# Patient Record
Sex: FEMALE | Race: WHITE | Employment: OTHER | URBAN - NONMETROPOLITAN AREA
[De-identification: names, ages, dates, MRNs, and addresses within clinical notes are randomized per-mention and may not be internally consistent; named-entity substitution may affect disease eponyms.]

---

## 2018-09-08 ENCOUNTER — HOSPITAL ENCOUNTER (OUTPATIENT)
Dept: LAB | Age: 83
Discharge: HOME | End: 2018-09-08

## 2018-09-08 LAB
INR BLD: 2.75 INDEX
PROTHROMBIN TIME: 31 SECONDS (ref 10–14.3)

## 2019-01-01 ENCOUNTER — OUTSIDE SERVICES (OUTPATIENT)
Dept: INTERNAL MEDICINE CLINIC | Age: 84
End: 2019-01-01

## 2019-01-01 ENCOUNTER — HOSPITAL ENCOUNTER (OUTPATIENT)
Age: 84
Setting detail: SPECIMEN
Discharge: HOME | End: 2019-09-25
Payer: MEDICARE

## 2019-01-01 ENCOUNTER — HOSPITAL ENCOUNTER (OUTPATIENT)
Age: 84
Setting detail: SPECIMEN
Discharge: HOME | End: 2019-07-24
Payer: MEDICARE

## 2019-01-01 ENCOUNTER — OUTSIDE SERVICES (OUTPATIENT)
Dept: INTERNAL MEDICINE CLINIC | Age: 84
End: 2019-01-01
Payer: MEDICARE

## 2019-01-01 ENCOUNTER — HOSPITAL ENCOUNTER (OUTPATIENT)
Age: 84
Discharge: HOME | End: 2019-11-13
Payer: MEDICARE

## 2019-01-01 ENCOUNTER — HOSPITAL ENCOUNTER (OUTPATIENT)
Age: 84
Setting detail: SPECIMEN
Discharge: HOME | End: 2019-12-18
Payer: MEDICARE

## 2019-01-01 ENCOUNTER — HOSPITAL ENCOUNTER (OUTPATIENT)
Age: 84
Discharge: HOME | End: 2019-09-18
Payer: MEDICARE

## 2019-01-01 ENCOUNTER — HOSPITAL ENCOUNTER (OUTPATIENT)
Age: 84
Discharge: HOME | End: 2019-08-07
Payer: MEDICARE

## 2019-01-01 ENCOUNTER — HOSPITAL ENCOUNTER (OUTPATIENT)
Age: 84
Discharge: HOME | End: 2019-10-16
Payer: MEDICARE

## 2019-01-01 ENCOUNTER — HOSPITAL ENCOUNTER (OUTPATIENT)
Age: 84
Discharge: HOME | End: 2019-07-31
Payer: MEDICARE

## 2019-01-01 ENCOUNTER — HOSPITAL ENCOUNTER (OUTPATIENT)
Age: 84
Discharge: HOME | End: 2019-08-14
Payer: MEDICARE

## 2019-01-01 ENCOUNTER — HOSPITAL ENCOUNTER (OUTPATIENT)
Age: 84
Setting detail: SPECIMEN
Discharge: HOME | End: 2019-12-11
Payer: MEDICARE

## 2019-01-01 ENCOUNTER — HOSPITAL ENCOUNTER (OUTPATIENT)
Age: 84
Discharge: HOME | End: 2019-10-23
Payer: MEDICARE

## 2019-01-01 ENCOUNTER — HOSPITAL ENCOUNTER (OUTPATIENT)
Age: 84
Setting detail: SPECIMEN
Discharge: HOME | End: 2019-09-12
Payer: MEDICARE

## 2019-01-01 ENCOUNTER — HOSPITAL ENCOUNTER (OUTPATIENT)
Age: 84
Discharge: HOME | End: 2019-09-04
Payer: MEDICARE

## 2019-01-01 ENCOUNTER — HOSPITAL ENCOUNTER (OUTPATIENT)
Age: 84
Discharge: HOME | End: 2019-08-28
Payer: MEDICARE

## 2019-01-01 ENCOUNTER — HOSPITAL ENCOUNTER (OUTPATIENT)
Age: 84
Discharge: HOME | End: 2019-10-02
Payer: MEDICARE

## 2019-01-01 ENCOUNTER — HOSPITAL ENCOUNTER (OUTPATIENT)
Age: 84
Discharge: HOME | End: 2019-10-09
Payer: MEDICARE

## 2019-01-01 ENCOUNTER — HOSPITAL ENCOUNTER (OUTPATIENT)
Age: 84
Discharge: HOME | End: 2019-08-21
Payer: MEDICARE

## 2019-01-01 ENCOUNTER — HOSPITAL ENCOUNTER (OUTPATIENT)
Age: 84
Setting detail: SPECIMEN
Discharge: HOME | End: 2019-11-06
Payer: MEDICARE

## 2019-01-01 ENCOUNTER — HOSPITAL ENCOUNTER (OUTPATIENT)
Age: 84
Setting detail: SPECIMEN
Discharge: HOME | End: 2019-12-31
Payer: MEDICARE

## 2019-01-01 ENCOUNTER — HOSPITAL ENCOUNTER (OUTPATIENT)
Age: 84
Setting detail: SPECIMEN
Discharge: HOME | End: 2019-10-30
Payer: MEDICARE

## 2019-01-01 ENCOUNTER — HOSPITAL ENCOUNTER (OUTPATIENT)
Age: 84
Discharge: HOME | End: 2019-09-09
Payer: MEDICARE

## 2019-01-01 ENCOUNTER — HOSPITAL ENCOUNTER (OUTPATIENT)
Age: 84
Discharge: HOME | End: 2019-09-10
Payer: MEDICARE

## 2019-01-01 ENCOUNTER — HOSPITAL ENCOUNTER (OUTPATIENT)
Age: 84
Setting detail: SPECIMEN
Discharge: HOME | End: 2019-11-20
Payer: MEDICARE

## 2019-01-01 ENCOUNTER — HOSPITAL ENCOUNTER (OUTPATIENT)
Age: 84
Setting detail: SPECIMEN
Discharge: HOME | End: 2019-12-05
Payer: MEDICARE

## 2019-01-01 ENCOUNTER — HOSPITAL ENCOUNTER (OUTPATIENT)
Age: 84
Discharge: HOME | End: 2019-12-24
Payer: MEDICARE

## 2019-01-01 DIAGNOSIS — I10 ESSENTIAL HYPERTENSION: ICD-10-CM

## 2019-01-01 DIAGNOSIS — I48.91 ATRIAL FIBRILLATION, UNSPECIFIED TYPE (HCC): Primary | ICD-10-CM

## 2019-01-01 DIAGNOSIS — E78.2 MIXED HYPERLIPIDEMIA: ICD-10-CM

## 2019-01-01 DIAGNOSIS — M19.90 ARTHRITIS: ICD-10-CM

## 2019-01-01 LAB
ALBUMIN SERPL-MCNC: 3.5 GM/DL (ref 3.4–5)
ALP BLD-CCNC: 102 IU/L (ref 40–129)
ALT SERPL-CCNC: 8 U/L (ref 10–40)
ANION GAP SERPL CALCULATED.3IONS-SCNC: 8 MMOL/L (ref 4–16)
AST SERPL-CCNC: 14 IU/L (ref 15–37)
BACTERIA: ABNORMAL /HPF
BACTERIA: ABNORMAL /HPF
BILIRUB SERPL-MCNC: 0.5 MG/DL (ref 0–1)
BILIRUBIN URINE: NEGATIVE MG/DL
BILIRUBIN URINE: NEGATIVE MG/DL
BLOOD, URINE: ABNORMAL
BLOOD, URINE: ABNORMAL
BUN BLDV-MCNC: 10 MG/DL (ref 6–23)
CALCIUM SERPL-MCNC: 9.2 MG/DL (ref 8.3–10.6)
CAST TYPE: ABNORMAL /HPF
CAST TYPE: NEGATIVE /HPF
CHLORIDE BLD-SCNC: 99 MMOL/L (ref 99–110)
CLARITY: ABNORMAL
CLARITY: ABNORMAL
CO2: 31 MMOL/L (ref 21–32)
COLOR: YELLOW
COLOR: YELLOW
CREAT SERPL-MCNC: 0.8 MG/DL (ref 0.6–1.1)
CRYSTAL TYPE: ABNORMAL /HPF
CRYSTAL TYPE: NEGATIVE /HPF
CULTURE: ABNORMAL
EPITHELIAL CELLS, UA: ABNORMAL /HPF
EPITHELIAL CELLS, UA: ABNORMAL /HPF
GFR AFRICAN AMERICAN: >60 ML/MIN/1.73M2
GFR NON-AFRICAN AMERICAN: >60 ML/MIN/1.73M2
GLUCOSE BLD-MCNC: 98 MG/DL (ref 70–99)
GLUCOSE, URINE: NEGATIVE MG/DL
GLUCOSE, URINE: NEGATIVE MG/DL
HCT VFR BLD CALC: 37.2 % (ref 37–47)
HCT VFR BLD CALC: 38.1 % (ref 37–47)
HEMOGLOBIN: 11.9 GM/DL (ref 12.5–16)
HEMOGLOBIN: 12.2 GM/DL (ref 12.5–16)
INR BLD: 1.44 INDEX
INR BLD: 1.57 INDEX
INR BLD: 1.58 INDEX
INR BLD: 1.66 INDEX
INR BLD: 1.79 INDEX
INR BLD: 1.85 INDEX
INR BLD: 2.01 INDEX
INR BLD: 2.12 INDEX
INR BLD: 2.16 INDEX
INR BLD: 2.16 INDEX
INR BLD: 2.25 INDEX
INR BLD: 2.28 INDEX
INR BLD: 2.39 INDEX
INR BLD: 2.47 INDEX
INR BLD: 2.56 INDEX
INR BLD: 2.63 INDEX
INR BLD: 2.72 INDEX
INR BLD: 2.87 INDEX
INR BLD: 2.88 INDEX
INR BLD: 2.9 INDEX
INR BLD: 3.02 INDEX
INR BLD: 3.08 INDEX
INR BLD: 3.08 INDEX
INR BLD: 3.68 INDEX
KETONES, URINE: NEGATIVE MG/DL
KETONES, URINE: NEGATIVE MG/DL
LEUKOCYTE ESTERASE, URINE: ABNORMAL
LEUKOCYTE ESTERASE, URINE: ABNORMAL
Lab: ABNORMAL
Lab: ABNORMAL
MCH RBC QN AUTO: 27.2 PG (ref 27–31)
MCH RBC QN AUTO: 27.5 PG (ref 27–31)
MCHC RBC AUTO-ENTMCNC: 32 % (ref 32–36)
MCHC RBC AUTO-ENTMCNC: 32 % (ref 32–36)
MCV RBC AUTO: 85 FL (ref 78–100)
MCV RBC AUTO: 85.9 FL (ref 78–100)
MUCUS: NEGATIVE HPF
NITRITE URINE, QUANTITATIVE: NEGATIVE
NITRITE URINE, QUANTITATIVE: NEGATIVE
PDW BLD-RTO: 14.8 % (ref 11.7–14.9)
PDW BLD-RTO: 15 % (ref 11.7–14.9)
PH, URINE: 5.5 (ref 5–8)
PH, URINE: 5.5 (ref 5–8)
PLATELET # BLD: 253 K/CU MM (ref 140–440)
PLATELET # BLD: 264 K/CU MM (ref 140–440)
PMV BLD AUTO: 9 FL (ref 7.5–11.1)
PMV BLD AUTO: 9.3 FL (ref 7.5–11.1)
POTASSIUM SERPL-SCNC: 3.2 MMOL/L (ref 3.5–5.1)
PROTEIN UA: 30 MG/DL
PROTEIN UA: NEGATIVE MG/DL
PROTHROMBIN TIME: 16.4 SECONDS (ref 9.12–12.5)
PROTHROMBIN TIME: 17.8 SECONDS (ref 9.12–12.5)
PROTHROMBIN TIME: 18.2 SECONDS (ref 11.7–14.5)
PROTHROMBIN TIME: 18.6 SECONDS (ref 8.9–12.9)
PROTHROMBIN TIME: 19.1 SECONDS (ref 9.12–12.5)
PROTHROMBIN TIME: 20.6 SECONDS (ref 11.7–14.5)
PROTHROMBIN TIME: 21.3 SECONDS (ref 11.7–14.5)
PROTHROMBIN TIME: 24.4 SECONDS (ref 9.12–12.5)
PROTHROMBIN TIME: 24.6 SECONDS (ref 9.12–12.5)
PROTHROMBIN TIME: 25 SECONDS (ref 11.7–14.5)
PROTHROMBIN TIME: 25.8 SECONDS (ref 9.12–12.5)
PROTHROMBIN TIME: 26.1 SECONDS (ref 11.7–14.5)
PROTHROMBIN TIME: 27.7 SECONDS (ref 9.12–12.5)
PROTHROMBIN TIME: 27.9 SECONDS (ref 9.12–12.5)
PROTHROMBIN TIME: 29.7 SECONDS (ref 9.12–12.5)
PROTHROMBIN TIME: 29.8 SECONDS (ref 9.12–12.5)
PROTHROMBIN TIME: 31.7 SECONDS (ref 11.7–14.5)
PROTHROMBIN TIME: 33.4 SECONDS (ref 11.7–14.5)
PROTHROMBIN TIME: 33.6 SECONDS (ref 11.7–14.5)
PROTHROMBIN TIME: 33.8 SECONDS (ref 11.7–14.5)
PROTHROMBIN TIME: 35.2 SECONDS (ref 11.7–14.5)
PROTHROMBIN TIME: 35.9 SECONDS (ref 11.7–14.5)
PROTHROMBIN TIME: 35.9 SECONDS (ref 9.12–12.5)
PROTHROMBIN TIME: 43.1 SECONDS (ref 9.12–12.5)
RBC # BLD: 4.33 M/CU MM (ref 4.2–5.4)
RBC # BLD: 4.48 M/CU MM (ref 4.2–5.4)
RBC URINE: ABNORMAL /HPF (ref 0–6)
RBC URINE: ABNORMAL /HPF (ref 0–6)
SODIUM BLD-SCNC: 138 MMOL/L (ref 135–145)
SPECIFIC GRAVITY UA: 1.03 (ref 1–1.03)
SPECIFIC GRAVITY UA: <1.005 (ref 1–1.03)
SPECIMEN: ABNORMAL
SPECIMEN: ABNORMAL
TOTAL COLONY COUNT: ABNORMAL
TOTAL COLONY COUNT: ABNORMAL
TOTAL PROTEIN: 6.5 GM/DL (ref 6.4–8.2)
UROBILINOGEN, URINE: 0.2 MG/DL (ref 0.2–1)
UROBILINOGEN, URINE: 0.2 MG/DL (ref 0.2–1)
VOLUME, (UVOL): 12 ML (ref 10–12)
WBC # BLD: 5.6 K/CU MM (ref 4–10.5)
WBC # BLD: 6.4 K/CU MM (ref 4–10.5)
WBC UA: ABNORMAL /HPF (ref 0–5)
WBC UA: ABNORMAL /HPF (ref 0–5)

## 2019-01-01 PROCEDURE — 36415 COLL VENOUS BLD VENIPUNCTURE: CPT

## 2019-01-01 PROCEDURE — 85027 COMPLETE CBC AUTOMATED: CPT

## 2019-01-01 PROCEDURE — 85610 PROTHROMBIN TIME: CPT

## 2019-01-01 PROCEDURE — 99308 SBSQ NF CARE LOW MDM 20: CPT | Performed by: INTERNAL MEDICINE

## 2019-01-01 PROCEDURE — 81001 URINALYSIS AUTO W/SCOPE: CPT

## 2019-01-01 PROCEDURE — 80053 COMPREHEN METABOLIC PANEL: CPT

## 2019-01-01 PROCEDURE — 87086 URINE CULTURE/COLONY COUNT: CPT

## 2019-04-07 ENCOUNTER — HOSPITAL ENCOUNTER (OUTPATIENT)
Age: 84
Setting detail: OBSERVATION
Discharge: NURSING, REHAB OR LONG-TERM CARE | End: 2019-04-10
Attending: EMERGENCY MEDICINE | Admitting: FAMILY MEDICINE
Payer: MEDICARE

## 2019-04-07 ENCOUNTER — APPOINTMENT (OUTPATIENT)
Dept: CT IMAGING | Age: 84
End: 2019-04-07
Payer: MEDICARE

## 2019-04-07 ENCOUNTER — APPOINTMENT (OUTPATIENT)
Dept: GENERAL RADIOLOGY | Age: 84
End: 2019-04-07
Payer: MEDICARE
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
## 2019-04-07 DIAGNOSIS — R29.898 WEAKNESS OF BOTH LOWER EXTREMITIES: ICD-10-CM

## 2019-04-07 DIAGNOSIS — N30.00 ACUTE CYSTITIS WITHOUT HEMATURIA: ICD-10-CM

## 2019-04-07 LAB
ALBUMIN SERPL-MCNC: 3.5 GM/DL (ref 3.4–5)
ALP BLD-CCNC: 86 IU/L (ref 40–129)
ALT SERPL-CCNC: 10 U/L (ref 10–40)
ANION GAP SERPL CALCULATED.3IONS-SCNC: 5 MMOL/L (ref 4–16)
APTT: 32.3 SECONDS (ref 24–40)
AST SERPL-CCNC: 17 IU/L (ref 15–37)
BACTERIA: ABNORMAL /HPF
BASOPHILS ABSOLUTE: 0 K/CU MM
BASOPHILS RELATIVE PERCENT: 0.3 % (ref 0–1)
BILIRUB SERPL-MCNC: 1 MG/DL (ref 0–1)
BILIRUBIN URINE: ABNORMAL MG/DL
BLOOD, URINE: NEGATIVE
BUN BLDV-MCNC: 25 MG/DL (ref 6–23)
CALCIUM SERPL-MCNC: 9.5 MG/DL (ref 8.3–10.6)
CAST TYPE: ABNORMAL /HPF
CHLORIDE BLD-SCNC: 102 MMOL/L (ref 99–110)
CLARITY: CLEAR
CO2: 35 MMOL/L (ref 21–32)
COLOR: YELLOW
CREAT SERPL-MCNC: 1.2 MG/DL (ref 0.6–1.1)
CRYSTAL TYPE: ABNORMAL /HPF
DIFFERENTIAL TYPE: ABNORMAL
EOSINOPHILS ABSOLUTE: 0.1 K/CU MM
EOSINOPHILS RELATIVE PERCENT: 0.5 % (ref 0–3)
EPITHELIAL CELLS, UA: ABNORMAL /HPF
GFR AFRICAN AMERICAN: 51 ML/MIN/1.73M2
GFR NON-AFRICAN AMERICAN: 42 ML/MIN/1.73M2
GLUCOSE BLD-MCNC: 155 MG/DL (ref 70–99)
GLUCOSE, URINE: NEGATIVE MG/DL
HCT VFR BLD CALC: 38.2 % (ref 37–47)
HEMOGLOBIN: 12.3 GM/DL (ref 12.5–16)
ICTOTEST: NEGATIVE
IMMATURE NEUTROPHIL %: 0.4 % (ref 0–0.43)
INR BLD: 2.13 INDEX
KETONES, URINE: ABNORMAL MG/DL
LEUKOCYTE ESTERASE, URINE: ABNORMAL
LYMPHOCYTES ABSOLUTE: 0.9 K/CU MM
LYMPHOCYTES RELATIVE PERCENT: 9.1 % (ref 24–44)
MCH RBC QN AUTO: 29.7 PG (ref 27–31)
MCHC RBC AUTO-ENTMCNC: 32.2 % (ref 32–36)
MCV RBC AUTO: 92.3 FL (ref 78–100)
MONOCYTES ABSOLUTE: 0.9 K/CU MM
MONOCYTES RELATIVE PERCENT: 9.3 % (ref 0–4)
MUCUS: NEGATIVE HPF
NITRITE URINE, QUANTITATIVE: NEGATIVE
PDW BLD-RTO: 14.1 % (ref 11.7–14.9)
PH, URINE: 6 (ref 5–8)
PLATELET # BLD: 189 K/CU MM (ref 140–440)
PMV BLD AUTO: 10 FL (ref 7.5–11.1)
POTASSIUM SERPL-SCNC: 3.5 MMOL/L (ref 3.5–5.1)
PROTEIN UA: 100 MG/DL
PROTHROMBIN TIME: 24.1 SECONDS (ref 9.12–12.5)
RBC # BLD: 4.14 M/CU MM (ref 4.2–5.4)
RBC URINE: ABNORMAL /HPF (ref 0–6)
SEGMENTED NEUTROPHILS ABSOLUTE COUNT: 8 K/CU MM
SEGMENTED NEUTROPHILS RELATIVE PERCENT: 80.4 % (ref 36–66)
SODIUM BLD-SCNC: 142 MMOL/L (ref 135–145)
SPECIFIC GRAVITY UA: 1.03 (ref 1–1.03)
TOTAL CK: 132 IU/L (ref 26–140)
TOTAL IMMATURE NEUTOROPHIL: 0.04 K/CU MM
TOTAL PROTEIN: 6.5 GM/DL (ref 6.4–8.2)
TROPONIN T: <0.01 NG/ML
UROBILINOGEN, URINE: 1 MG/DL (ref 0.2–1)
VOLUME, (UVOL): 12 ML (ref 10–12)
WBC # BLD: 9.9 K/CU MM (ref 4–10.5)
WBC UA: ABNORMAL /HPF (ref 0–5)

## 2019-04-07 PROCEDURE — 87086 URINE CULTURE/COLONY COUNT: CPT

## 2019-04-07 PROCEDURE — 82550 ASSAY OF CK (CPK): CPT

## 2019-04-07 PROCEDURE — 85610 PROTHROMBIN TIME: CPT

## 2019-04-07 PROCEDURE — 70450 CT HEAD/BRAIN W/O DYE: CPT

## 2019-04-07 PROCEDURE — 71045 X-RAY EXAM CHEST 1 VIEW: CPT

## 2019-04-07 PROCEDURE — 87077 CULTURE AEROBIC IDENTIFY: CPT

## 2019-04-07 PROCEDURE — 6370000000 HC RX 637 (ALT 250 FOR IP): Performed by: FAMILY MEDICINE

## 2019-04-07 PROCEDURE — 72131 CT LUMBAR SPINE W/O DYE: CPT

## 2019-04-07 PROCEDURE — 85730 THROMBOPLASTIN TIME PARTIAL: CPT

## 2019-04-07 PROCEDURE — 85025 COMPLETE CBC W/AUTO DIFF WBC: CPT

## 2019-04-07 PROCEDURE — 6360000002 HC RX W HCPCS: Performed by: EMERGENCY MEDICINE

## 2019-04-07 PROCEDURE — 72125 CT NECK SPINE W/O DYE: CPT

## 2019-04-07 PROCEDURE — 96365 THER/PROPH/DIAG IV INF INIT: CPT

## 2019-04-07 PROCEDURE — 80053 COMPREHEN METABOLIC PANEL: CPT

## 2019-04-07 PROCEDURE — 2580000003 HC RX 258: Performed by: EMERGENCY MEDICINE

## 2019-04-07 PROCEDURE — G0378 HOSPITAL OBSERVATION PER HR: HCPCS

## 2019-04-07 PROCEDURE — 84484 ASSAY OF TROPONIN QUANT: CPT

## 2019-04-07 PROCEDURE — 2580000003 HC RX 258: Performed by: FAMILY MEDICINE

## 2019-04-07 PROCEDURE — 72170 X-RAY EXAM OF PELVIS: CPT

## 2019-04-07 PROCEDURE — 99285 EMERGENCY DEPT VISIT HI MDM: CPT

## 2019-04-07 PROCEDURE — 81001 URINALYSIS AUTO W/SCOPE: CPT

## 2019-04-07 PROCEDURE — 72128 CT CHEST SPINE W/O DYE: CPT

## 2019-04-07 RX ORDER — TRIAMTERENE AND HYDROCHLOROTHIAZIDE 37.5; 25 MG/1; MG/1
1 TABLET ORAL
Status: DISCONTINUED | OUTPATIENT
Start: 2019-04-08 | End: 2019-04-09

## 2019-04-07 RX ORDER — SODIUM CHLORIDE 0.9 % (FLUSH) 0.9 %
10 SYRINGE (ML) INJECTION PRN
Status: DISCONTINUED | OUTPATIENT
Start: 2019-04-07 | End: 2019-04-10 | Stop reason: HOSPADM

## 2019-04-07 RX ORDER — CELECOXIB 100 MG/1
200 CAPSULE ORAL DAILY
Status: DISCONTINUED | OUTPATIENT
Start: 2019-04-07 | End: 2019-04-10 | Stop reason: HOSPADM

## 2019-04-07 RX ORDER — OXYCODONE HYDROCHLORIDE AND ACETAMINOPHEN 5; 325 MG/1; MG/1
1 TABLET ORAL EVERY 6 HOURS PRN
Status: DISCONTINUED | OUTPATIENT
Start: 2019-04-07 | End: 2019-04-10 | Stop reason: HOSPADM

## 2019-04-07 RX ORDER — ACETAMINOPHEN 325 MG/1
650 TABLET ORAL EVERY 4 HOURS PRN
Status: DISCONTINUED | OUTPATIENT
Start: 2019-04-07 | End: 2019-04-10 | Stop reason: HOSPADM

## 2019-04-07 RX ORDER — SODIUM CHLORIDE 9 MG/ML
INJECTION, SOLUTION INTRAVENOUS CONTINUOUS
Status: DISCONTINUED | OUTPATIENT
Start: 2019-04-07 | End: 2019-04-10 | Stop reason: HOSPADM

## 2019-04-07 RX ORDER — LANOLIN ALCOHOL/MO/W.PET/CERES
6 CREAM (GRAM) TOPICAL NIGHTLY PRN
Status: DISCONTINUED | OUTPATIENT
Start: 2019-04-07 | End: 2019-04-10 | Stop reason: HOSPADM

## 2019-04-07 RX ORDER — FAMOTIDINE 20 MG/1
20 TABLET, FILM COATED ORAL 2 TIMES DAILY
Status: DISCONTINUED | OUTPATIENT
Start: 2019-04-07 | End: 2019-04-10 | Stop reason: HOSPADM

## 2019-04-07 RX ORDER — ATORVASTATIN CALCIUM 10 MG/1
10 TABLET, FILM COATED ORAL DAILY
Status: DISCONTINUED | OUTPATIENT
Start: 2019-04-07 | End: 2019-04-10 | Stop reason: HOSPADM

## 2019-04-07 RX ORDER — SODIUM CHLORIDE 0.9 % (FLUSH) 0.9 %
10 SYRINGE (ML) INJECTION 2 TIMES DAILY
Status: DISCONTINUED | OUTPATIENT
Start: 2019-04-07 | End: 2019-04-10 | Stop reason: HOSPADM

## 2019-04-07 RX ORDER — ONDANSETRON 2 MG/ML
4 INJECTION INTRAMUSCULAR; INTRAVENOUS EVERY 6 HOURS PRN
Status: DISCONTINUED | OUTPATIENT
Start: 2019-04-07 | End: 2019-04-10 | Stop reason: HOSPADM

## 2019-04-07 RX ORDER — ACETAMINOPHEN 500 MG
500 TABLET ORAL 2 TIMES DAILY
COMMUNITY

## 2019-04-07 RX ORDER — POLYETHYLENE GLYCOL 3350 17 G/17G
17 POWDER, FOR SOLUTION ORAL DAILY PRN
Status: DISCONTINUED | OUTPATIENT
Start: 2019-04-07 | End: 2019-04-10 | Stop reason: HOSPADM

## 2019-04-07 RX ORDER — BRIMONIDINE TARTRATE 2 MG/ML
1 SOLUTION/ DROPS OPHTHALMIC 2 TIMES DAILY
Status: CANCELLED | OUTPATIENT
Start: 2019-04-07

## 2019-04-07 RX ORDER — SENNA PLUS 8.6 MG/1
1 TABLET ORAL DAILY PRN
Status: DISCONTINUED | OUTPATIENT
Start: 2019-04-07 | End: 2019-04-10 | Stop reason: HOSPADM

## 2019-04-07 RX ORDER — WARFARIN SODIUM 5 MG/1
5 TABLET ORAL DAILY
Status: DISCONTINUED | OUTPATIENT
Start: 2019-04-08 | End: 2019-04-09 | Stop reason: DRUGHIGH

## 2019-04-07 RX ORDER — SODIUM CHLORIDE 0.9 % (FLUSH) 0.9 %
10 SYRINGE (ML) INJECTION EVERY 12 HOURS SCHEDULED
Status: DISCONTINUED | OUTPATIENT
Start: 2019-04-07 | End: 2019-04-09

## 2019-04-07 RX ADMIN — SODIUM CHLORIDE: 9 INJECTION, SOLUTION INTRAVENOUS at 20:18

## 2019-04-07 RX ADMIN — ATORVASTATIN CALCIUM 10 MG: 10 TABLET, FILM COATED ORAL at 20:18

## 2019-04-07 RX ADMIN — FAMOTIDINE 20 MG: 20 TABLET ORAL at 20:18

## 2019-04-07 RX ADMIN — MELATONIN TAB 3 MG 6 MG: 3 TAB at 20:18

## 2019-04-07 RX ADMIN — CEFTRIAXONE SODIUM 1 G: 1 INJECTION, POWDER, FOR SOLUTION INTRAMUSCULAR; INTRAVENOUS at 17:36

## 2019-04-07 RX ADMIN — SODIUM CHLORIDE, PRESERVATIVE FREE 10 ML: 5 INJECTION INTRAVENOUS at 17:38

## 2019-04-07 ASSESSMENT — PAIN SCALES - GENERAL
PAINLEVEL_OUTOF10: 0

## 2019-04-07 NOTE — ED NOTES
I sent a Perfect Serve message to the [facility] regarding admission for this patient.      Addi Banda  04/07/19 8417

## 2019-04-07 NOTE — H&P
Medications:   Medications:    sodium chloride flush  10 mL Intravenous BID    cefTRIAXone (ROCEPHIN) IV  1 g Intravenous Once      Infusions:   PRN Meds:      Current Facility-Administered Medications:     sodium chloride flush 0.9 % injection 10 mL, 10 mL, Intravenous, BID, Zeynep Russ DO, 10 mL at 04/07/19 1738    cefTRIAXone (ROCEPHIN) 1 g IVPB in 50 mL D5W minibag, 1 g, Intravenous, Once, Zeynep Russ DO, Last Rate: 100 mL/hr at 04/07/19 1736, 1 g at 04/07/19 1736    Current Outpatient Medications:     acetaminophen (TYLENOL) 500 MG tablet, Take 500 mg by mouth 2 times daily, Disp: , Rfl:     triamterene-hydrochlorothiazide (MAXZIDE-25) 37.5-25 MG per tablet, Take 1 tablet by mouth Indications: Monday, Wednesday, Friday , Disp: , Rfl:     celecoxib (CELEBREX) 200 MG capsule, Take 200 mg by mouth daily. , Disp: , Rfl:     metoprolol (LOPRESSOR) 50 MG tablet, Take 50 mg by mouth daily. Take 1/2 tablet daily, Disp: , Rfl:     Potassium Chloride (KLOR-CON 10 PO), Take 1 tablet by mouth daily. , Disp: , Rfl:     therapeutic multivitamin-minerals (THERAGRAN-M) tablet, Take 1 tablet by mouth daily. , Disp: , Rfl:     atorvastatin (LIPITOR) 10 MG tablet, Take 10 mg by mouth daily. , Disp: , Rfl:     Warfarin Sodium (COUMADIN PO), Take by mouth daily Indications: Monday, Wedenesday, Friday 5 mg; alternate days 2.5 mg 5 mg, 4 mg alternant , Disp: , Rfl:     Calcium Carbonate-Vitamin D (CALCIUM + D PO), Take 600 mg by mouth daily. , Disp: , Rfl:     warfarin (COUMADIN) 5 MG tablet, Take one tab po daily as directed, Disp: 30 tablet, Rfl: 0    oxyCODONE-acetaminophen (PERCOCET) 5-325 MG per tablet, Take 1-2 tablets by mouth every 6 hours as needed for Pain for 90 doses. , Disp: 90 tablet, Rfl: 0    Psyllium (METAMUCIL PO), Take  by mouth., Disp: , Rfl:     Omega 3-6-9 Fatty Acids (OMEGA 3-6-9 COMPLEX PO), Take 1 tablet by mouth daily. , Disp: , Rfl:     FLAXSEED, Take  by mouth., Disp: , Rfl:     Famotidine (PEPCID PO), Take  by mouth., Disp: , Rfl:     Diphenhydramine-APAP, sleep, (TYLENOL PM EXTRA STRENGTH PO), Take  by mouth., Disp: , Rfl:     Ascorbic Acid (VITAMIN C) 500 MG tablet, Take 1,000 mg by mouth daily. , Disp: , Rfl:     History of present illness     Chief Complaint: Fall (fell sideways from toilet. Unknown how long was down. Found by family. son states pt c/o lower back pain. No c/o pain at this time. )  CC: Fall   Ja Short is a 80 y.o.  female  who presents with to the Ed with daughter after a Fall. Patient was found by Son in the bathroom. She states that she had fallen off commode and was found between commode and bathtub . Patient struck back against tub. She is unsure if she hit her head. Patient lives alone. Daughter reports progressive weakness and decline in the last two weeks. Patient was brought in by family members because she is having difficultly ambulating. In the Ed Ct of the head , C spine and lumbar spine were number for acute fractures. UA revealed possible UTI               Review of Systems   Ten point ROS reviewed and negative, unless as noted below. GENERAL:  Denies fever, chills, night sweats, or changes in weight. EYES:  Denies recent visual changes. ENT:  Denies ear pain, hearing loss or tinnitus  RESP:  Denies any cough, dyspnea, or wheezing. CV:  Denies any chest pain with exertion or at rest, palpitations, syncope, or edema. GI:  Denies any dysphagia, nausea, vomiting, abdominal pain, heartburn, changes in bowel habit, melena or rectal bleeding  MUSCULOSKELETAL:  Denies any joint swelling, joint pain, or loss of range of motion. NEURO:  Denies any headaches, tremors, dizziness, vertigo, memory loss, confusion, weakness, numbness or tingling. PSYCH:  Denies any sleeping problems, history of abuse, marital discord.   HEME/LYMPHATIC/IMMUNO:  Denies , bruising, bleeding abnormalities   ENDO:  Denies any heat or cold intolerance, panemiaolyuria or polydipsia. Objective:   No intake or output data in the 24 hours ending 04/07/19 1743   Vitals:   Vitals:    04/07/19 1432   BP: 113/61   Pulse: 81   Resp: 16   Temp: 98.2 °F (36.8 °C)   SpO2: 97%     Physical Exam:   Gen:  awake, alert, cooperative, no apparent distress  EYES:Lids and lashes normal, pupils equal, round ,extra ocular muscles intact, sclera clear, conjunctiva normal  ENT:  Normocephalic, oral pharynx with moist mucus membranes  NECK:  Supple, symmetrical, trachea midline, no adenopathy,  LUNGS:  Clear to auscultate bilaterally, no rales ronchi or wheezing noted. CARDIOVASCULAR:  regular rate and rhythm, normal S1 and S2,no murmur noted, peripheral pulses 2+, no pitting edemal   ABDOMEN: Normal BS, Non tender, non distended, no HSM noted. MUSCULOSKELETAL:  ROM of all extremities grossly wnl   NEUROLOGIC: AOx 3,  Cranial nerves II-XII are grossly intact. Motor is 5 out of 5 bilaterally. Sensory is intact, no lateralizing findings. SKIN: bruising of left chest wall   normal skin color, turgor, no redness, warmth, or swelling      Past Medical History:      Past Medical History:   Diagnosis Date    Arrhythmia     Arthritis     left hip disabling    H/O blood clots     H/O echocardiogram 7/11/13    Moderate concentric lvh with normal systolic function, mild to moderate MR and TR EF 50-55%          HTN (hypertension)     Hyperlipidemia      PSHX:  has a past surgical history that includes Total knee arthroplasty (5959); knee surgery (1999); Hysterectomy; and Hip Arthroplasty (Left, 8/12/2013). Allergies: Allergies   Allergen Reactions    Neomycin     Pcn [Penicillins]      Fungal infection    Pneumococcal Vaccines      Skin irritation       FAM HX: family history is not on file. Family history reviewed and is essentially negative unless as stated above. Soc HX:   Social History     Socioeconomic History    Marital status:       Spouse name: None    Number of children: None    Years of education: None    Highest education level: None   Occupational History    None   Social Needs    Financial resource strain: None    Food insecurity:     Worry: None     Inability: None    Transportation needs:     Medical: None     Non-medical: None   Tobacco Use    Smoking status: Never Smoker    Smokeless tobacco: Never Used   Substance and Sexual Activity    Alcohol use: No    Drug use: No    Sexual activity: None   Lifestyle    Physical activity:     Days per week: None     Minutes per session: None    Stress: None   Relationships    Social connections:     Talks on phone: None     Gets together: None     Attends Zoroastrianism service: None     Active member of club or organization: None     Attends meetings of clubs or organizations: None     Relationship status: None    Intimate partner violence:     Fear of current or ex partner: None     Emotionally abused: None     Physically abused: None     Forced sexual activity: None   Other Topics Concern    None   Social History Narrative    None   Results for Angel Larios (MRN [id number]) as of 4/7/2019 17:22   Ref.  Range 4/7/2019 15:15   Sodium Latest Ref Range: 135 - 145 MMOL/L 142   Potassium Latest Ref Range: 3.5 - 5.1 MMOL/L 3.5   Chloride Latest Ref Range: 99 - 110 mMol/L 102   CO2 Latest Ref Range: 21 - 32 MMOL/L 35 (H)   BUN Latest Ref Range: 6 - 23 MG/DL 25 (H)   Creatinine Latest Ref Range: 0.6 - 1.1 MG/DL 1.2 (H)   Anion Gap Latest Ref Range: 4 - 16  5   GFR Non- Latest Ref Range: >60 mL/min/1.73m2 42 (L)   GFR  Latest Ref Range: >60 mL/min/1.73m2 51 (L)   Glucose Latest Ref Range: 70 - 99 MG/ (H)   Calcium Latest Ref Range: 8.3 - 10.6 MG/DL 9.5   Total Protein Latest Ref Range: 6.4 - 8.2 GM/DL 6.5   Total CK Latest Ref Range: 26 - 140 IU/L 132   Troponin T Latest Ref Range: <0.01 NG/ML <0.010   Albumin Latest Ref Range: 3.4 - 5.0 GM/DL 3.5   Alk Phos Latest Ref Range: 40 - 129 IU/L 86   ALT Latest Ref Range: 10 - 40 U/L 10   AST Latest Ref Range: 15 - 37 IU/L 17   Bilirubin Latest Ref Range: 0.0 - 1.0 MG/DL 1.0     Results for iDpak Mcfarlane (MRN [id number]) as of 4/7/2019 17:22   Ref. Range 4/7/2019 15:15   WBC Latest Ref Range: 4.0 - 10.5 K/CU MM 9.9   RBC Latest Ref Range: 4.2 - 5.4 M/CU MM 4.14 (L)   Hemoglobin Quant Latest Ref Range: 12.5 - 16.0 GM/DL 12.3 (L)   Hematocrit Latest Ref Range: 37 - 47 % 38.2   MCV Latest Ref Range: 78 - 100 FL 92.3   MCH Latest Ref Range: 27 - 31 PG 29.7   MCHC Latest Ref Range: 32.0 - 36.0 % 32.2   MPV Latest Ref Range: 7.5 - 11.1 FL 10.0   RDW Latest Ref Range: 11.7 - 14.9 % 14.1   Platelet Count Latest Ref Range: 140 - 440 K/CU    CT Head   No acute intracranial abnormality.  Moderate chronic small ischemic disease   and age related involutional changes.       Severe multilevel degenerate changes of the cervical, thoracic, and lumbar   spine without evidence of acute fracture traumatic malalignment. CT Cervical Spine   Severe multilevel degenerate changes of the cervical, thoracic, and lumbar   spine without evidence of acute fracture traumatic malalignment.    CT Lumbar Spine   Severe multilevel degenerate changes of the cervical, thoracic, and lumbar   spine without evidence of acute fracture traumatic malalignment.               Electronically signed by Benjamin Apple MD on 4/7/2019 at 5:43 PM

## 2019-04-07 NOTE — ED NOTES
Dr. Yesenia Sorto is speaking with Dr. Yohan Lopes regarding admission for this patient.      Althea Lamb  04/07/19 9536

## 2019-04-07 NOTE — ED NOTES
The patient was assisted to the bedside commode and provided the urine specimen.      Marina Stein RN  04/07/19 2418

## 2019-04-07 NOTE — ED NOTES
When the patient stood up from the commode she had some stool on her bottom. She was cleaned up and a clean pull up was put on the patient. The daughter was at the bedside and instructed me to through her panties in the trash.                 Ramiro Michelle RN  04/07/19 8446

## 2019-04-07 NOTE — ED PROVIDER NOTES
Emergency Department Encounter  Location: [facility] [address] [place] [address]    Patient: Norma Chandler  MRN: [id number]  : 1929  Date of evaluation: 2019  ED Provider: Naseem Mireles DO, FACEP    Chief Complaint:    Fall (fell sideways from toilet. Unknown how long was down. Found by family. son states pt c/o lower back pain. No c/o pain at this time. )    [place]:  Norma Chandler is a 80 y.o. female that presents to the emergency department  By her family. The patient was found  In her bathroom. She did fallen off her commode and was between the commode and the bathtub. The family is concerned that she struck her back across the edge of the tub. She is additionally complaining of back pain but upon arrival she has no complaints. Her family states she will not admit to any pain or any  Problems. The patient's family stattes that she was not able to get up on her own and that they had to help her extensively get up because her legs would not hold her up. They got her up and gave her lunch  And then brought her to the emergency department. The patient did get up this morning and dress herself but they state she is unable to ambulate without assistance. ROS:  At least 10 systems reviewed and otherwise acutely negative except as in the [address] Past Medical History:   Diagnosis Date    Arrhythmia     Arthritis     left hip disabling    H/O blood clots     H/O echocardiogram 13    Moderate concentric lvh with normal systolic function, mild to moderate MR and TR EF 50-55%          HTN (hypertension)     Hyperlipidemia      Past Surgical History:   Procedure Laterality Date    HIP ARTHROPLASTY Left 2013    HYSTERECTOMY      KNEE SURGERY      right Total Knee    TOTAL KNEE ARTHROPLASTY      left     History reviewed. No pertinent family history. Social History     Socioeconomic History    Marital status:       Spouse name: Not on file    Number of children: Not on file    Years of education: Not on file    Highest education level: Not on file   Occupational History    Not on file   Social Needs    Financial resource strain: Not on file    Food insecurity:     Worry: Not on file     Inability: Not on file    Transportation needs:     Medical: Not on file     Non-medical: Not on file   Tobacco Use    Smoking status: Never Smoker    Smokeless tobacco: Never Used   Substance and Sexual Activity    Alcohol use: No    Drug use: No    Sexual activity: Not on file   Lifestyle    Physical activity:     Days per week: Not on file     Minutes per session: Not on file    Stress: Not on file   Relationships    Social connections:     Talks on phone: Not on file     Gets together: Not on file     Attends Synagogue service: Not on file     Active member of club or organization: Not on file     Attends meetings of clubs or organizations: Not on file     Relationship status: Not on file    Intimate partner violence:     Fear of current or ex partner: Not on file     Emotionally abused: Not on file     Physically abused: Not on file     Forced sexual activity: Not on file   Other Topics Concern    Not on file   Social History Narrative    Not on file     Current Facility-Administered Medications   Medication Dose Route Frequency Provider Last Rate Last Dose    sodium chloride flush 0.9 % injection 10 mL  10 mL Intravenous BID Dorna Bunkers, DO   10 mL at 04/07/19 1738    cefTRIAXone (ROCEPHIN) 1 g IVPB in 50 mL D5W minibag  1 g Intravenous Once Dorna Bunkers,  mL/hr at 04/07/19 1736 1 g at 04/07/19 1736     Current Outpatient Medications   Medication Sig Dispense Refill    acetaminophen (TYLENOL) 500 MG tablet Take 500 mg by mouth 2 times daily      triamterene-hydrochlorothiazide (MAXZIDE-25) 37.5-25 MG per tablet Take 1 tablet by mouth Indications: Monday, Wednesday, Friday       celecoxib (CELEBREX) 200 MG capsule Take 200 mg by mouth daily.       metoprolol (LOPRESSOR) 50 MG tablet Take 50 mg by mouth daily. Take 1/2 tablet daily      Potassium Chloride (KLOR-CON 10 PO) Take 1 tablet by mouth daily.  therapeutic multivitamin-minerals (THERAGRAN-M) tablet Take 1 tablet by mouth daily.  atorvastatin (LIPITOR) 10 MG tablet Take 10 mg by mouth daily.  Warfarin Sodium (COUMADIN PO) Take by mouth daily Indications: Monday, Wedenesday, Friday 5 mg; alternate days 2.5 mg 5 mg, 4 mg alternant       Calcium Carbonate-Vitamin D (CALCIUM + D PO) Take 600 mg by mouth daily.  warfarin (COUMADIN) 5 MG tablet Take one tab po daily as directed 30 tablet 0    oxyCODONE-acetaminophen (PERCOCET) 5-325 MG per tablet Take 1-2 tablets by mouth every 6 hours as needed for Pain for 90 doses. 90 tablet 0    Psyllium (METAMUCIL PO) Take  by mouth.  Omega 3-6-9 Fatty Acids (OMEGA 3-6-9 COMPLEX PO) Take 1 tablet by mouth daily.  FLAXSEED Take  by mouth.  Famotidine (PEPCID PO) Take  by mouth.  Diphenhydramine-APAP, sleep, (TYLENOL PM EXTRA STRENGTH PO) Take  by mouth.  Ascorbic Acid (VITAMIN C) 500 MG tablet Take 1,000 mg by mouth daily. Allergies   Allergen Reactions    Neomycin     Pcn [Penicillins]      Fungal infection    Pneumococcal Vaccines      Skin irritation       Nursing Notes Reviewed    Physical Exam:  ED Triage Vitals [04/07/19 1432]   Enc Vitals Group      /61      Pulse 81      Resp 16      Temp 98.2 °F (36.8 °C)      Temp Source Oral      SpO2 97 %      Weight 140 lb (63.5 kg)      Height 5' 5\" (1.651 m)      Head Circumference       Peak Flow       Pain Score       Pain Loc       Pain Edu? Excl. in [address]? GENERAL APPEARANCE: Awake and alert. Cooperative. No acute distress. Nontoxic in appearance. She is hard of hearing  HEAD: Normocephalic. Atraumatic. EYES: EOM's grossly intact. Sclera anicteric. ENT: Tolerates saliva. No trismus. NECK: Supple. Trachea midline. CARDIO: RRR.  Radial pulse 2+.   LUNGS: Respirations unlabored. CTAB. ABDOMEN: Soft. Non-distended. Non-tender. Examination of her back reveals no tenderness palpation over her cervical spine thoraacic spine or lumbar spine. There is slight bruising present on her left posterior chest wall   EXTREMITIES: No acute deformities. SKIN: Warm and dry. NEUROLOGICAL: No gross facial drooping. Moves all 4 extremities spontaneously. PSYCHIATRIC: Normal mood.      Labs:  Results for orders placed or performed during the hospital encounter of 04/07/19   CBC Auto Differential   Result Value Ref Range    WBC 9.9 4.0 - 10.5 K/CU MM    RBC 4.14 (L) 4.2 - 5.4 M/CU MM    Hemoglobin 12.3 (L) 12.5 - 16.0 GM/DL    Hematocrit 38.2 37 - 47 %    MCV 92.3 78 - 100 FL    MCH 29.7 27 - 31 PG    MCHC 32.2 32.0 - 36.0 %    RDW 14.1 11.7 - 14.9 %    Platelets 452 491 - 390 K/CU MM    MPV 10.0 7.5 - 11.1 FL    Differential Type AUTOMATED DIFFERENTIAL     Segs Relative 80.4 (H) 36 - 66 %    Lymphocytes % 9.1 (L) 24 - 44 %    Monocytes % 9.3 (H) 0 - 4 %    Eosinophils % 0.5 0 - 3 %    Basophils % 0.3 0 - 1 %    Segs Absolute 8.0 K/CU MM    Lymphocytes # 0.9 K/CU MM    Monocytes # 0.9 K/CU MM    Eosinophils # 0.1 K/CU MM    Basophils # 0.0 K/CU MM    Immature Neutrophil % 0.4 0 - 0.43 %    Total Immature Neutrophil 0.04 K/CU MM   Comprehensive Metabolic Panel   Result Value Ref Range    Sodium 142 135 - 145 MMOL/L    Potassium 3.5 3.5 - 5.1 MMOL/L    Chloride 102 99 - 110 mMol/L    CO2 35 (H) 21 - 32 MMOL/L    BUN 25 (H) 6 - 23 MG/DL    CREATININE 1.2 (H) 0.6 - 1.1 MG/DL    Glucose 155 (H) 70 - 99 MG/DL    Calcium 9.5 8.3 - 10.6 MG/DL    Alb 3.5 3.4 - 5.0 GM/DL    Total Protein 6.5 6.4 - 8.2 GM/DL    Total Bilirubin 1.0 0.0 - 1.0 MG/DL    ALT 10 10 - 40 U/L    AST 17 15 - 37 IU/L    Alkaline Phosphatase 86 40 - 129 IU/L    GFR Non- 42 (L) >60 mL/min/1.73m2    GFR  51 (L) >60 mL/min/1.73m2    Anion Gap 5 4 - 16   Troponin   Result Value Ref Range    Troponin T <0.010 <0.01 NG/ML   Protime/INR & PTT   Result Value Ref Range    Protime 24.1 (H) 9.12 - 12.5 SECONDS    INR 2.13 INDEX    aPTT 32.3 24 - 40 SECONDS   Urinalysis with Microscopic   Result Value Ref Range    Color, UA YELLOW YELLOW    Clarity, UA CLEAR CLEAR    Glucose, Urine NEGATIVE NEGATIVE MG/DL    Bilirubin Urine SMALL (A) NEGATIVE MG/DL    Ketones, Urine TRACE (A) NEGATIVE MG/DL    Specific Gravity, UA 1.028 1.001 - 1.035    Blood, Urine NEGATIVE NEGATIVE    pH, Urine 6.0 5.0 - 8.0    Protein,  (A) NEGATIVE MG/DL    Urobilinogen, Urine 1.0 0.2 - 1.0 MG/DL    Nitrite Urine, Quantitative NEGATIVE NEGATIVE    Leukocyte Esterase, Urine SMALL (A) NEGATIVE    Volume, (UVOL) 12 10 - 12 ML    Ictotest NEGATIVE     RBC, UA 1 TO 3 0 - 6 /HPF    WBC, UA 20 TO 30 0 - 5 /HPF    Epi Cells 4 TO 7  SQUAMOUS   /HPF    Cast Type NO CAST FORMS SEEN NO CAST FORMS SEEN /HPF    Bacteria, UA MANY (A) NEGATIVE /HPF    Crystal Type NONE SEEN NEGATIVE /HPF    Mucus, UA NEGATIVE NEGATIVE HPF   CK   Result Value Ref Range    Total  26 - 140 IU/L           Radiographs (if obtained):  [] The following radiograph was interpreted by myself in the absence of a radiologist:  [x] Radiologist's Report reviewed at time of ED visit:  CT Lumbar Spine WO Contrast   Final Result   No acute intracranial abnormality. Moderate chronic small ischemic disease   and age related involutional changes. Severe multilevel degenerate changes of the cervical, thoracic, and lumbar   spine without evidence of acute fracture traumatic malalignment. CT THORACIC SPINE WO CONTRAST   Final Result   No acute intracranial abnormality. Moderate chronic small ischemic disease   and age related involutional changes. Severe multilevel degenerate changes of the cervical, thoracic, and lumbar   spine without evidence of acute fracture traumatic malalignment.          CT Cervical Spine WO Contrast   Final Result   No acute intracranial abnormality. Moderate chronic small ischemic disease   and age related involutional changes. Severe multilevel degenerate changes of the cervical, thoracic, and lumbar   spine without evidence of acute fracture traumatic malalignment. CT Head WO Contrast   Final Result   No acute intracranial abnormality. Moderate chronic small ischemic disease   and age related involutional changes. Severe multilevel degenerate changes of the cervical, thoracic, and lumbar   spine without evidence of acute fracture traumatic malalignment. XR PELVIS (1-2 VIEWS)   Final Result   No acute osseous abnormality. XR CHEST PORTABLE   Final Result   No acute process. ED Course and MDM:   patient fell off of her commode earlier today. She is having weakness in her lower extremities. Family are concerned about her going back to her independent living arrangements and are considdering getting her placed in either assisted living or another  Nursing home situation where she can have more acute attention. The patient does appear to have a urinary tract infection. I will start her on Rocephiin here in the ER. She will be admitted for lower extremity weakness and UTI. I discussed her care with Dr. Makenna Herring who has come down to the ER to evaluate the patient for admission. Final Impression:  1. Fall, initial encounter    2. Weakness of both lower extremities    3. Acute cystitis without hematuria      DISPOSITION Decision To Admit    Patient referred to: No follow-up provider specified.   Discharge medications:  New Prescriptions    No medications on file     (Please note that portions of this note may have been completed with a voice recognition program. Efforts were made to edit the dictations but occasionally words are mis-transcribed.)    Heather Williamson DO, [address] [facility]  Board certified in [address] [place], [place]  04/07/19 0135

## 2019-04-07 NOTE — ED NOTES
The patients daughter asked to talk to me in the hallway. She wants her mother admitted and then placed in a ECF. Dr. Aura Bass was notified.          Jayson Cummins RN  04/07/19 2023

## 2019-04-08 LAB
ALBUMIN SERPL-MCNC: 3.2 GM/DL (ref 3.4–5)
ALP BLD-CCNC: 74 IU/L (ref 40–129)
ALT SERPL-CCNC: 10 U/L (ref 10–40)
ANION GAP SERPL CALCULATED.3IONS-SCNC: 12 MMOL/L (ref 4–16)
AST SERPL-CCNC: 17 IU/L (ref 15–37)
BASOPHILS ABSOLUTE: 0 K/CU MM
BASOPHILS RELATIVE PERCENT: 0.7 % (ref 0–1)
BILIRUB SERPL-MCNC: 0.8 MG/DL (ref 0–1)
BUN BLDV-MCNC: 25 MG/DL (ref 6–23)
CALCIUM SERPL-MCNC: 8.9 MG/DL (ref 8.3–10.6)
CHLORIDE BLD-SCNC: 105 MMOL/L (ref 99–110)
CO2: 25 MMOL/L (ref 21–32)
CREAT SERPL-MCNC: 0.9 MG/DL (ref 0.6–1.1)
DIFFERENTIAL TYPE: ABNORMAL
EOSINOPHILS ABSOLUTE: 0.1 K/CU MM
EOSINOPHILS RELATIVE PERCENT: 2 % (ref 0–3)
GFR AFRICAN AMERICAN: >60 ML/MIN/1.73M2
GFR NON-AFRICAN AMERICAN: 59 ML/MIN/1.73M2
GLUCOSE BLD-MCNC: 103 MG/DL (ref 70–99)
HCT VFR BLD CALC: 34.4 % (ref 37–47)
HEMOGLOBIN: 10.9 GM/DL (ref 12.5–16)
IMMATURE NEUTROPHIL %: 0.3 % (ref 0–0.43)
INR BLD: 1.88 INDEX
LYMPHOCYTES ABSOLUTE: 1.3 K/CU MM
LYMPHOCYTES RELATIVE PERCENT: 21.2 % (ref 24–44)
MCH RBC QN AUTO: 29 PG (ref 27–31)
MCHC RBC AUTO-ENTMCNC: 31.7 % (ref 32–36)
MCV RBC AUTO: 91.5 FL (ref 78–100)
MONOCYTES ABSOLUTE: 0.7 K/CU MM
MONOCYTES RELATIVE PERCENT: 10.8 % (ref 0–4)
PDW BLD-RTO: 14.3 % (ref 11.7–14.9)
PLATELET # BLD: 165 K/CU MM (ref 140–440)
PMV BLD AUTO: 9.8 FL (ref 7.5–11.1)
POTASSIUM SERPL-SCNC: 3.6 MMOL/L (ref 3.5–5.1)
PROTHROMBIN TIME: 21.3 SECONDS (ref 9.12–12.5)
RBC # BLD: 3.76 M/CU MM (ref 4.2–5.4)
SEGMENTED NEUTROPHILS ABSOLUTE COUNT: 4 K/CU MM
SEGMENTED NEUTROPHILS RELATIVE PERCENT: 65 % (ref 36–66)
SODIUM BLD-SCNC: 142 MMOL/L (ref 135–145)
TOTAL IMMATURE NEUTOROPHIL: 0.02 K/CU MM
TOTAL PROTEIN: 5.9 GM/DL (ref 6.4–8.2)
WBC # BLD: 6.1 K/CU MM (ref 4–10.5)

## 2019-04-08 PROCEDURE — 97530 THERAPEUTIC ACTIVITIES: CPT

## 2019-04-08 PROCEDURE — 36415 COLL VENOUS BLD VENIPUNCTURE: CPT

## 2019-04-08 PROCEDURE — 85610 PROTHROMBIN TIME: CPT

## 2019-04-08 PROCEDURE — 2580000003 HC RX 258: Performed by: FAMILY MEDICINE

## 2019-04-08 PROCEDURE — 2580000003 HC RX 258: Performed by: EMERGENCY MEDICINE

## 2019-04-08 PROCEDURE — G0378 HOSPITAL OBSERVATION PER HR: HCPCS

## 2019-04-08 PROCEDURE — 6360000002 HC RX W HCPCS: Performed by: FAMILY MEDICINE

## 2019-04-08 PROCEDURE — 96375 TX/PRO/DX INJ NEW DRUG ADDON: CPT

## 2019-04-08 PROCEDURE — 97166 OT EVAL MOD COMPLEX 45 MIN: CPT

## 2019-04-08 PROCEDURE — 6360000002 HC RX W HCPCS: Performed by: NURSE PRACTITIONER

## 2019-04-08 PROCEDURE — 85025 COMPLETE CBC W/AUTO DIFF WBC: CPT

## 2019-04-08 PROCEDURE — 97162 PT EVAL MOD COMPLEX 30 MIN: CPT

## 2019-04-08 PROCEDURE — 93010 ELECTROCARDIOGRAM REPORT: CPT | Performed by: INTERNAL MEDICINE

## 2019-04-08 PROCEDURE — 93005 ELECTROCARDIOGRAM TRACING: CPT | Performed by: NURSE PRACTITIONER

## 2019-04-08 PROCEDURE — 6370000000 HC RX 637 (ALT 250 FOR IP): Performed by: FAMILY MEDICINE

## 2019-04-08 PROCEDURE — 96376 TX/PRO/DX INJ SAME DRUG ADON: CPT

## 2019-04-08 PROCEDURE — 80053 COMPREHEN METABOLIC PANEL: CPT

## 2019-04-08 RX ORDER — DIPHENHYDRAMINE HYDROCHLORIDE 50 MG/ML
12.5 INJECTION INTRAMUSCULAR; INTRAVENOUS NIGHTLY PRN
Status: DISCONTINUED | OUTPATIENT
Start: 2019-04-08 | End: 2019-04-10 | Stop reason: HOSPADM

## 2019-04-08 RX ADMIN — SODIUM CHLORIDE, PRESERVATIVE FREE 10 ML: 5 INJECTION INTRAVENOUS at 20:28

## 2019-04-08 RX ADMIN — DIPHENHYDRAMINE HYDROCHLORIDE 12.5 MG: 50 INJECTION INTRAMUSCULAR; INTRAVENOUS at 20:27

## 2019-04-08 RX ADMIN — WARFARIN SODIUM 5 MG: 5 TABLET ORAL at 17:43

## 2019-04-08 RX ADMIN — CELECOXIB 200 MG: 100 CAPSULE ORAL at 08:56

## 2019-04-08 RX ADMIN — ATORVASTATIN CALCIUM 10 MG: 10 TABLET, FILM COATED ORAL at 08:56

## 2019-04-08 RX ADMIN — SODIUM CHLORIDE, PRESERVATIVE FREE 10 ML: 5 INJECTION INTRAVENOUS at 08:57

## 2019-04-08 RX ADMIN — TRIAMTERENE AND HYDROCHLOROTHIAZIDE 1 TABLET: 37.5; 25 TABLET ORAL at 08:56

## 2019-04-08 RX ADMIN — CEFTRIAXONE SODIUM 1 G: 1 INJECTION, POWDER, FOR SOLUTION INTRAMUSCULAR; INTRAVENOUS at 17:43

## 2019-04-08 RX ADMIN — FAMOTIDINE 20 MG: 20 TABLET ORAL at 20:27

## 2019-04-08 RX ADMIN — MELATONIN TAB 3 MG 6 MG: 3 TAB at 20:27

## 2019-04-08 RX ADMIN — FAMOTIDINE 20 MG: 20 TABLET ORAL at 08:56

## 2019-04-08 RX ADMIN — SODIUM CHLORIDE: 9 INJECTION, SOLUTION INTRAVENOUS at 09:55

## 2019-04-08 RX ADMIN — SODIUM CHLORIDE, PRESERVATIVE FREE 10 ML: 5 INJECTION INTRAVENOUS at 08:56

## 2019-04-08 ASSESSMENT — PAIN SCALES - GENERAL
PAINLEVEL_OUTOF10: 0

## 2019-04-08 NOTE — PROGRESS NOTES
PHARMACY TO DOSE COUMADIN PER DR. Francis Mc  INDICATION = A-fib     GOAL INR = 2-3     HOME DOSE = warfarin 5 mg daily  DAILY INR ORDERED? yes    AGE = 80 y.o.  SEX = female  HEIGHT = 5' 7\" (1.702 m)  Wt Readings from Last 3 Encounters:   04/07/19 144 lb 8 oz (65.5 kg)   08/06/13 144 lb (65.3 kg)   07/26/13 144 lb (65.3 kg)     Recent Labs     04/07/19  1515   INR 2.13   HGB 12.3*   HCT 38.2          POTENTIAL DRUG INTERACTIONS = ceftriaxone. DOSING PLAN:  - INR = 2.13  - Plan to continue patient on home warfarin regimen: warfarin 5 mg daily  - Pharmacy will monitor INR and warfarin-drug interactions daily and adjust warfarin dose if needed.     Viviann Angelucci [facility]  4/8/2019  3:37 AM  _______________________________________________

## 2019-04-08 NOTE — CARE COORDINATION
CM spoke with the patient's daughter Ilir Carroll ([phone]) for discharge planning. Patient lives in an apartment alone, has insurance with Rx coverage & PCP, requires assistance with ADL's, does not drive (daughter provides transportation), and was receiving [facility] through [place] [phone] prior to admission (personal aide for bathing only). Patient's daughter states she also has an emergency call system but states she has fallen multiple times and does press her call button for help. Patient's daughter is hoping for SNF placement but patient is currently admitted under observation. CM explained the Medicare guideline for SNF placement. Patient's daughter expressed frustration but understanding.

## 2019-04-08 NOTE — PROGRESS NOTES
on Mon Wed Fri      Infusions:    sodium chloride 75 mL/hr at 04/07/19 2018     PRN Meds:   oxyCODONE-acetaminophen 1 tablet Q6H PRN   sodium chloride flush 10 mL PRN   ondansetron 4 mg Q6H PRN   polyethylene glycol 17 g Daily PRN   senna 1 tablet Daily PRN   acetaminophen 650 mg Q4H PRN   melatonin 6 mg Nightly PRN     Subjective:   80year old female admitted after  a fall. Today she reports no pain. Overnight vitals were stable. 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 CARDIOVASCULAR:  regular rate and rhythm, normal S1 and S2,no murmur noted, peripheral pulses 2+, no pitting edema  ABDOMEN: Normal BS, Non tender, non distended, no HSM noted. MUSCULOSKELETAL:  ROM of all extremities grossly wnl  NEUROLOGIC: AOx 3,  Cranial nerves II-XII are grossly intact. Motor is 5 out of 5 bilaterally. Sensory is intact, no lateralizing findings.    SKIN:  no bruising or bleeding, normal skin color, turgor, no redness, warmth, or swelling  Psych : normal mood       Data:       CBC   Recent Labs     04/07/19  1515 04/08/19  0530   WBC 9.9 6.1   HGB 12.3* 10.9*   HCT 38.2 34.4*    165      BMP Recent Labs     04/07/19  1515 04/08/19  0530    142   K 3.5 3.6    105   CO2 35* 25   BUN 25* 25*   CREATININE 1.2* 0.9 Electronically signed by Larissa Gaytan MD on 4/8/2019 at 9:40 AM

## 2019-04-08 NOTE — PROGRESS NOTES
Occupational Therapy   Occupational Therapy Initial Assessment  Date: 2019   Patient Name: Gladys Womack  MRN: [id number]     : 1929    Date of Service: 2019    Discharge Recommendations:  Subacute/Skilled Nursing Facility       Assessment   Performance deficits / Impairments: Decreased functional mobility ; Decreased ADL status; Decreased ROM; Decreased strength;Decreased safe awareness;Decreased vision/visual deficit; Decreased balance;Decreased endurance;Decreased cognition  Assessment: 81 yo female with fall at home; pt states she does not remember falling. She presents with impaired cognition, decreased I adls, functional mobility and decreased AROM. OT to see pt to maximize I with adls, transfers, ROM and strength  Treatment Diagnosis: decreased I adls, functional mobility, decreased cognition,   Prognosis: Fair  Decision Making: Medium Complexity  History: see above  Exam: see above  Patient Education: role of OT, transfers  Barriers to Learning: impaired hearing and cognition  REQUIRES OT FOLLOW UP: Yes  Activity Tolerance  Activity Tolerance: Patient Tolerated treatment well  Safety Devices  Safety Devices in place: Yes  Type of devices: Call light within reach; Left in chair;Nurse notified           Patient Diagnosis(es): The primary encounter diagnosis was Fall, initial encounter. Diagnoses of Weakness of both lower extremities and Acute cystitis without hematuria were also pertinent to this visit. has a past medical history of Arrhythmia, Arthritis, H/O blood clots, H/O echocardiogram, HTN (hypertension), and Hyperlipidemia. has a past surgical history that includes Total knee arthroplasty (); knee surgery (); Hysterectomy; and Hip Arthroplasty (Left, 2013).     Treatment Diagnosis: decreased I adls, functional mobility, decreased cognition,       Restrictions  Restrictions/Precautions  Restrictions/Precautions: Fall Risk    Subjective   General  Patient assessed for Transfers  Sit to stand: Minimal assistance  Stand to sit: Minimal assistance  Transfer Comments: poor walker safety; had a backward lean  Vision - Basic Assessment  Prior Vision: Wears glasses all the time  Visual Field Cut: Left  Cognition  Overall Cognitive Status: Exceptions  Following Commands: Follows one step commands with increased time; Follows one step commands with repetition  Attention Span: Attends with cues to redirect  Memory: Decreased recall of biographical Information;Decreased recall of recent events;Decreased short term memory  Safety Judgement: Decreased awareness of need for assistance  Problem Solving: Decreased awareness of errors;Assistance required to implement solutions;Assistance required to generate solutions;Assistance required to identify errors made  Insights: Decreased awareness of deficits  Initiation: Requires cues for all  Sequencing: Requires cues for all        Sensation  Overall Sensation Status: WFL        LUE AROM (degrees)  LUE General AROM: decreased shoulder flex, abd; elbow and wrist WFL  Left Hand AROM (degrees)  Left Hand General AROM: decreased AROM shoulder flex/ext, Abd   elbow , wrist WFL  LUE Strength  LUE Strength Comment: 4-/5  RUE Strength  RUE Strength Comment: 4-/5                   Plan   Plan  Times per week: 1+  Current Treatment Recommendations: Strengthening, ROM, Balance Training, Functional Mobility Training, Endurance Training, Safety Education & Training, Patient/Caregiver Education & Training, Self-Care / ADL      Goals  Short term goals  Time Frame for Short term goals: until discharge  Short term goal 1: Pt will be SBA for functional adl transfers to toilet, bed or chair w/o LOB or falls with min vc for walker safety  Short term goal 2: Pt will be SBA UB adls/ Min A LB adls  Short term goal 3: Pt will be able to stand for 5-7 min SBA w/o LOB or falls toperform adls  Short term goal 4: Pt will be min vc for BUE strengthening exercises to improve endurance and ability to be more I with transfers  Patient Goals   Patient goals : to go home       Therapy Time   Individual Concurrent Group Co-treatment   Time In 1130         Time Out 1205         Minutes 35         Timed Code Treatment Minutes: Meche Soni 92, OT

## 2019-04-08 NOTE — PROGRESS NOTES
Physical Therapy    Facility/Department: [facility] UNIT  Initial Assessment    NAME: Rose Whitfield  : 1929  MRN: [id number]    Date of Service: 2019    Discharge Recommendations:  Continue to assess pending progress, Subacute/Skilled Nursing Facility, ECF with PT, 24 hour supervision or assist(swing bed)   PT Equipment Recommendations  Other: continue to assess    Assessment   Body structures, Functions, Activity limitations: Decreased functional mobility ; Decreased strength;Decreased endurance;Decreased vision/visual deficit; Decreased coordination;Decreased ADL status; Decreased safe awareness;Decreased high-level IADLs;Decreased cognition;Decreased balance  Assessment: Pt is a pleasant 79 yo female who would benefit from skilled PT to address decreased strength, balance, endurance, and functional mobility. Prognosis: Good  Decision Making: Medium Complexity  History: see above  Exam: see above  Clinical Presentation: evolving  Patient Education: pt educated on hand placement and safety with transfers. Pt family educated on PT recommendation that pt not return home alone. Family in agreement. Barriers to Learning: cognition  REQUIRES PT FOLLOW UP: Yes  Activity Tolerance  Activity Tolerance: Patient Tolerated treatment well       Patient Diagnosis(es): The primary encounter diagnosis was Fall, initial encounter. Diagnoses of Weakness of both lower extremities and Acute cystitis without hematuria were also pertinent to this visit. has a past medical history of Arrhythmia, Arthritis, H/O blood clots, H/O echocardiogram, HTN (hypertension), and Hyperlipidemia. has a past surgical history that includes Total knee arthroplasty (); knee surgery (); Hysterectomy; and Hip Arthroplasty (Left, 2013).     Restrictions  Restrictions/Precautions  Restrictions/Precautions: Fall Risk  Vision/Hearing  Vision: Impaired(pt cannot see out of L eye)  Vision Exceptions: Wears glasses at all times  Hearing: Exceptions to [facility]  Hearing Exceptions: Hard of hearing/hearing concerns;Right hearing aid     Subjective  General  Chart Reviewed: Yes  Patient assessed for rehabilitation services?: Yes  Family / Caregiver Present: Yes(pt daughter  present)  Follows Commands: Within Functional Limits  General Comment  Comments: Pt presented in bed with HOB slightly elevated. Subjective  Subjective: pt reports she is fair good  Pain Screening  Patient Currently in Pain: Denies  Pain Assessment  Pain Assessment: 0-10  Pain Level: 0  Vital Signs  Patient Currently in Pain: Denies       Orientation     Social/Functional History  Social/Functional History  Lives With: Alone  Type of Home: Apartment  Home Layout: One level  Home Access: Level entry  Bathroom Shower/Tub: Walk-in shower(pt reports she takes a sponge bath. )  Bathroom Toilet: Standard  Home Equipment: 4 wheeled walker, Lift chair, Alert Button(pt ambulates with a 4 [employer]. pt family reports she does not use alert button. )  ADL Assistance: Independent  Homemaking Responsibilities: Yes  Meal Prep Responsibility: Primary(pt has meals on wheels 6x/week and uses the [employer]. Pt  usually eats oatmeal in am. )  Laundry Responsibility: No(pt reports her kids do laundry)  Cleaning Responsibility: No(pt reports her daughters clean)  Shopping Responsibility: No(pt stopped making list several months ago and family gets groceries. )  Other (Comment): pt family report they check in 2x/day and they give her meds. Ambulation Assistance: Needs assistance(pt ambulates with 4 WW)  Active : No  Leisure & Hobbies: pt reports she goes down to the community room and plays cards and bingo. IADL Comments: Pt reports I with ADLs including dressing and bathing. Additional Comments: Pt family reports she has a hair appt every thursday and they make doctors appointments then too. They report they eat o ut at [facility] on Thursdays.    Cognition        Objective          AROM RLE (degrees)  RLE AROM: WNL  AROM LLE (degrees)  LLE AROM : WNL  Strength RLE  Strength RLE: Exception  R Hip Flexion: 3/5  R Knee Flexion: 4-/5  R Knee Extension: 4-/5  Strength LLE  Strength LLE: Exception  L Hip Flexion: 4-/5  L Knee Flexion: 4-/5  L Knee Extension: 4-/5  Tone RLE  RLE Tone: Normotonic  Tone LLE  LLE Tone: Normotonic  Sensation  Overall Sensation Status: WNL(per pt report)  Bed mobility  Supine to Sit: Supervision(with HOB elevated and using HR. Pt required 2 attempts )  Transfers  Sit to Stand: Contact guard assistance(pt performed with lack of safety awareness and poor hand placement despite max cues. )  Stand to sit: Contact guard assistance;Minimal Assistance(pt lopped into chair with lack of safety awareness and no hand placement. )  Bed to Chair: Contact guard assistance  Stand Pivot Transfers: Contact guard assistance  Ambulation  Ambulation?: Yes  WB Status: FWB  More Ambulation?: No  Ambulation 1  Surface: level tile  Device: Rolling Walker  Assistance: Contact guard assistance  Quality of Gait: Pt demonstrated unsteady gait with L LE turned outward and L foot slightly outside the walker. She ambulated with L heel off the ground and deviated from a straight path.    Distance: 7' 8'  Stairs/Curb  Stairs?: No     Balance  Posture: Fair  Sitting - Static: Fair  Sitting - Dynamic: Poor  Standing - Static: Fair;-  Standing - Dynamic: Poor        Plan   Plan  Times per week: Mon-Sat 3+/week  Current Treatment Recommendations: Functional Mobility Training, Strengthening, IADL Training, Neuromuscular Re-education, Home Exercise Program, Equipment Evaluation, Education, & procurement, Transfer Training, Gait Training, Safety Education & Training, Balance Training, Cognitive Reorientation, ADL/Self-care Training, Endurance Training, Stair training, Patient/Caregiver Education & Training, Positioning(ther ex, ther act, bed mobility.)  Safety Devices  Type of devices: Left in chair, Gait belt, Call light within reach, Chair alarm in place, Nurse notified(pt family present)    Goals  Short term goals  Time Frame for Short term goals: 1 week or until discharge:   Short term goal 1: Pt will demonstrate the ability to safely perform bed mobility with supervision and HOB flat. Short term goal 2: Pt will demonsrate the ability to safely transfer sit to stand and stand to sit with proper hand placement and mod cues. Short term goal 3: Pt will demonstrate the ability to safely ambulate 50 feet with a 4WW with B LE inside the walker and L heel down.         Therapy Time   Individual Concurrent Group Co-treatment   Time In 5881         Time Out 1505         Minutes 29         Timed Code Treatment Minutes: [address] , [place], [address] [place] [address]

## 2019-04-09 LAB
ANION GAP SERPL CALCULATED.3IONS-SCNC: 10 MMOL/L (ref 4–16)
BASOPHILS ABSOLUTE: 0 K/CU MM
BASOPHILS RELATIVE PERCENT: 0.5 % (ref 0–1)
BUN BLDV-MCNC: 16 MG/DL (ref 6–23)
CALCIUM SERPL-MCNC: 9.3 MG/DL (ref 8.3–10.6)
CHLORIDE BLD-SCNC: 103 MMOL/L (ref 99–110)
CO2: 29 MMOL/L (ref 21–32)
CREAT SERPL-MCNC: 0.9 MG/DL (ref 0.6–1.1)
DIFFERENTIAL TYPE: ABNORMAL
EOSINOPHILS ABSOLUTE: 0.1 K/CU MM
EOSINOPHILS RELATIVE PERCENT: 1.4 % (ref 0–3)
GFR AFRICAN AMERICAN: >60 ML/MIN/1.73M2
GFR NON-AFRICAN AMERICAN: 59 ML/MIN/1.73M2
GLUCOSE BLD-MCNC: 108 MG/DL (ref 70–99)
HCT VFR BLD CALC: 37.6 % (ref 37–47)
HEMOGLOBIN: 12.2 GM/DL (ref 12.5–16)
IMMATURE NEUTROPHIL %: 0.3 % (ref 0–0.43)
INR BLD: 1.65 INDEX
LYMPHOCYTES ABSOLUTE: 1.3 K/CU MM
LYMPHOCYTES RELATIVE PERCENT: 17.8 % (ref 24–44)
MCH RBC QN AUTO: 29.4 PG (ref 27–31)
MCHC RBC AUTO-ENTMCNC: 32.4 % (ref 32–36)
MCV RBC AUTO: 90.6 FL (ref 78–100)
MONOCYTES ABSOLUTE: 0.8 K/CU MM
MONOCYTES RELATIVE PERCENT: 11.1 % (ref 0–4)
PDW BLD-RTO: 13.8 % (ref 11.7–14.9)
PLATELET # BLD: 183 K/CU MM (ref 140–440)
PMV BLD AUTO: 9.8 FL (ref 7.5–11.1)
POTASSIUM SERPL-SCNC: 3.6 MMOL/L (ref 3.5–5.1)
PROTHROMBIN TIME: 18.7 SECONDS (ref 9.12–12.5)
RBC # BLD: 4.15 M/CU MM (ref 4.2–5.4)
SEGMENTED NEUTROPHILS ABSOLUTE COUNT: 5.1 K/CU MM
SEGMENTED NEUTROPHILS RELATIVE PERCENT: 68.9 % (ref 36–66)
SODIUM BLD-SCNC: 142 MMOL/L (ref 135–145)
TOTAL IMMATURE NEUTOROPHIL: 0.02 K/CU MM
WBC # BLD: 7.4 K/CU MM (ref 4–10.5)

## 2019-04-09 PROCEDURE — 2580000003 HC RX 258: Performed by: FAMILY MEDICINE

## 2019-04-09 PROCEDURE — 85610 PROTHROMBIN TIME: CPT

## 2019-04-09 PROCEDURE — G0378 HOSPITAL OBSERVATION PER HR: HCPCS

## 2019-04-09 PROCEDURE — 6360000002 HC RX W HCPCS: Performed by: NURSE PRACTITIONER

## 2019-04-09 PROCEDURE — 96375 TX/PRO/DX INJ NEW DRUG ADDON: CPT

## 2019-04-09 PROCEDURE — 85025 COMPLETE CBC W/AUTO DIFF WBC: CPT

## 2019-04-09 PROCEDURE — 80048 BASIC METABOLIC PNL TOTAL CA: CPT

## 2019-04-09 PROCEDURE — 6370000000 HC RX 637 (ALT 250 FOR IP): Performed by: FAMILY MEDICINE

## 2019-04-09 PROCEDURE — 36415 COLL VENOUS BLD VENIPUNCTURE: CPT

## 2019-04-09 PROCEDURE — 6360000002 HC RX W HCPCS: Performed by: FAMILY MEDICINE

## 2019-04-09 PROCEDURE — 96376 TX/PRO/DX INJ SAME DRUG ADON: CPT

## 2019-04-09 PROCEDURE — 2580000003 HC RX 258: Performed by: EMERGENCY MEDICINE

## 2019-04-09 RX ORDER — WARFARIN SODIUM 7.5 MG/1
7.5 TABLET ORAL
Status: COMPLETED | OUTPATIENT
Start: 2019-04-09 | End: 2019-04-09

## 2019-04-09 RX ORDER — HYDRALAZINE HYDROCHLORIDE 20 MG/ML
5 INJECTION INTRAMUSCULAR; INTRAVENOUS ONCE
Status: COMPLETED | OUTPATIENT
Start: 2019-04-09 | End: 2019-04-09

## 2019-04-09 RX ORDER — METOPROLOL TARTRATE 50 MG/1
50 TABLET, FILM COATED ORAL DAILY
Status: DISCONTINUED | OUTPATIENT
Start: 2019-04-09 | End: 2019-04-10 | Stop reason: HOSPADM

## 2019-04-09 RX ORDER — TRIAMTERENE AND HYDROCHLOROTHIAZIDE 37.5; 25 MG/1; MG/1
1 TABLET ORAL DAILY
Status: DISCONTINUED | OUTPATIENT
Start: 2019-04-09 | End: 2019-04-10 | Stop reason: HOSPADM

## 2019-04-09 RX ADMIN — SODIUM CHLORIDE: 9 INJECTION, SOLUTION INTRAVENOUS at 13:03

## 2019-04-09 RX ADMIN — TRIAMTERENE AND HYDROCHLOROTHIAZIDE 1 TABLET: 37.5; 25 TABLET ORAL at 08:44

## 2019-04-09 RX ADMIN — ATORVASTATIN CALCIUM 10 MG: 10 TABLET, FILM COATED ORAL at 08:44

## 2019-04-09 RX ADMIN — OXYCODONE HYDROCHLORIDE AND ACETAMINOPHEN 1 TABLET: 5; 325 TABLET ORAL at 00:08

## 2019-04-09 RX ADMIN — METOPROLOL TARTRATE 50 MG: 50 TABLET ORAL at 08:45

## 2019-04-09 RX ADMIN — CELECOXIB 200 MG: 100 CAPSULE ORAL at 08:44

## 2019-04-09 RX ADMIN — WARFARIN SODIUM 7.5 MG: 7.5 TABLET ORAL at 18:05

## 2019-04-09 RX ADMIN — HYDRALAZINE HYDROCHLORIDE 5 MG: 20 INJECTION INTRAMUSCULAR; INTRAVENOUS at 20:19

## 2019-04-09 RX ADMIN — CEFTRIAXONE SODIUM 1 G: 1 INJECTION, POWDER, FOR SOLUTION INTRAMUSCULAR; INTRAVENOUS at 18:05

## 2019-04-09 RX ADMIN — SODIUM CHLORIDE: 9 INJECTION, SOLUTION INTRAVENOUS at 00:06

## 2019-04-09 RX ADMIN — OXYCODONE HYDROCHLORIDE AND ACETAMINOPHEN 1 TABLET: 5; 325 TABLET ORAL at 21:44

## 2019-04-09 RX ADMIN — SODIUM CHLORIDE, PRESERVATIVE FREE 10 ML: 5 INJECTION INTRAVENOUS at 20:20

## 2019-04-09 RX ADMIN — FAMOTIDINE 20 MG: 20 TABLET ORAL at 20:19

## 2019-04-09 RX ADMIN — FAMOTIDINE 20 MG: 20 TABLET ORAL at 08:45

## 2019-04-09 RX ADMIN — MELATONIN TAB 3 MG 6 MG: 3 TAB at 22:25

## 2019-04-09 ASSESSMENT — PAIN SCALES - GENERAL
PAINLEVEL_OUTOF10: 0
PAINLEVEL_OUTOF10: 4
PAINLEVEL_OUTOF10: 5
PAINLEVEL_OUTOF10: 0
PAINLEVEL_OUTOF10: 3
PAINLEVEL_OUTOF10: 2
PAINLEVEL_OUTOF10: 0

## 2019-04-09 ASSESSMENT — PAIN DESCRIPTION - PAIN TYPE
TYPE: CHRONIC PAIN
TYPE: CHRONIC PAIN

## 2019-04-09 ASSESSMENT — PAIN DESCRIPTION - LOCATION
LOCATION: GENERALIZED
LOCATION: GENERALIZED

## 2019-04-09 NOTE — DISCHARGE INSTR - COC
Resp 16   Ht 5' 7\" (1.702 m)   Wt 144 lb 14.4 oz (65.7 kg)   SpO2 97%   BMI 22.69 kg/m²     Last documented pain score (0-10 scale): Pain Level: 0  Last Weight:   Wt Readings from Last 1 Encounters:   04/09/19 144 lb 14.4 oz (65.7 kg)     Mental Status:  alert and able to concentrate and follow conversation, occasional confusion    IV Access:  - None    Nursing Mobility/ADLs:  Walking   Assisted  Transfer  Assisted  Bathing  Assisted  Dressing  Assisted  Toileting  Assisted  Feeding  Assisted  Med Admin  Assisted  Med Delivery   whole    Wound Care Documentation and Therapy:  Pressure Ulcer 08/15/13 Buttocks Right;Upper; Lower Stage II two open areas noted on right upper buttock cheeck and right lower buttock cheek measuring 1.5 mc by 1 cm  and 1.5 cm  by 1.5 cm respectively,  coccyx is noted to have skin tear and is reddened  (Active)   Number of days: 2062       Incision 08/12/13 Hip Left (Active)   Number of days: 2066        Elimination:  Continence:   · Bowel: No  · Bladder: No  Urinary Catheter: None   Colostomy/Ileostomy/Ileal Conduit: No       Date of Last BM: 4/10/2019    Intake/Output Summary (Last 24 hours) at 4/9/2019 1518  Last data filed at 4/9/2019 1502  Gross per 24 hour   Intake 2353 ml   Output 1400 ml   Net 953 ml     I/O last 3 completed shifts: In: 2113 [P.O.:360; I.V.:1753]  Out: 1400 [Urine:1400]    Safety Concerns:     Sundowners Sundrome and History of Falls (last 30 days)    Impairments/Disabilities:      Hearing    Nutrition Therapy:  Current Nutrition Therapy:   - Oral Diet:  Cardiac    Routes of Feeding: Oral  Liquids: Thin Liquids  Daily Fluid Restriction: no  Last Modified Barium Swallow with Video (Video Swallowing Test): not done    Treatments at the Time of Hospital Discharge:   Respiratory Treatments: N/A  Oxygen Therapy:  is not on home oxygen therapy.   Ventilator:    - No ventilator support    Rehab Therapies: Physical Therapy and Occupational Therapy  Weight Bearing Status/Restrictions: No weight bearing restirctions  Other Medical Equipment (for information only, NOT a DME order):  walker  Other Treatments: N/A    Patient's personal belongings (please select all that are sent with patient):  Glasses, Hearing Aides bilateral, Dentures upper and lower    RN SIGNATURE:  Electronically signed by Marietta Figueroa RN on 4/10/19 at 10:09 AM    CASE MANAGEMENT/SOCIAL WORK SECTION    Inpatient Status Date: 4/7/19 (Observation)    Readmission Risk Assessment Score:  Readmission Risk              Risk of Unplanned Readmission:        8           Discharging to Facility/ Agency   · Name: [facility]  · Address: [place] [address] [address]   · Phone: [phone]  · Fax: [phone]    Dialysis Facility (if applicable)   · Name:  · Address:  · Dialysis Schedule:  · Phone:  · Fax:    / signature: Electronically signed by Charley Carranza RN on 4/9/19 at 3:19 PM    PHYSICIAN SECTION    Prognosis: Fair    Condition at Discharge: Stable    Rehab Potential (if transferring to Rehab): Fair    Recommended Labs or Other Treatments After Discharge: ua & bmp    Physician Certification: I certify the above information and transfer of Lauri Ebchata  is necessary for the continuing treatment of the diagnosis listed and that she requires Cristino Ankit for greater 30 days.      Update Admission H&P: No change in H&P    PHYSICIAN SIGNATURE:  Electronically signed by Joe Wright MD on 4/10/19 at 9:19 AM

## 2019-04-09 NOTE — CARE COORDINATION
VANI notified by Nina with [facility] that the patient's daughter has decided to pay privately in order to have patient placed. Nina states that the patient can be accepted Wednesday 4/10. VANI completed the PASRR electronically.

## 2019-04-09 NOTE — CARE COORDINATION
VANI spoke with the patient's daughter Chivo Betts to notify her that Nina states [facility] would be willing to accept the patient with private payment until Medicaid was approved. Chivo Betts states she would like to speak with Nina regarding cost.  VANI contacted Nina at [facility] and given Dedra's contact information. VANI will follow.

## 2019-04-09 NOTE — PROGRESS NOTES
97.5 °F (36.4 °C)  Min: 97.1 °F (36.2 °C)  Max: 97.9 °F (36.6 °C)  RESPIRATIONS RANGE: Resp  Av.7  Min: 16  Max: 18  PULSE RANGE: Pulse  Av.5  Min: 62  Max: 94  BLOOD PRESSURE RANGE:  Systolic (80FAI), WOI:112 , Min:147 , YMF:954   ; Diastolic (75ZPP), ZXV:26, Min:69, Max:114    PULSE OXIMETRY RANGE: SpO2  Av.7 %  Min: 95 %  Max: 98 %  24HR INTAKE/OUTPUT:      Intake/Output Summary (Last 24 hours) at 2019 1416  Last data filed at 2019 7341  Gross per 24 hour   Intake 2113 ml   Output 1400 ml   Net 713 ml       OBJECTIVE:    General appearance:   Head: atraumatic & normocephalic  Oral cavity: moist mucous membranes with normal dentition  Neck: supple with no lymphadenopathy, JVD down, trachea central  CVS: normal S1S2 with no additional heart sounds, no lower extremity edema  Respi: good air entry in both lung fields with no other adventious breath sounds  Abdo: soft & non tender, with +ve bowel sounds, no guarding ,rigidity or rebound tenderness  CNS: no focal weakness or sensory deficits peripherally, DTR's equal bilaterally, CN2-12 normal  Skin: no rash, purpurea or eruptions  MS: no muscle tenderness, normal ROM in all major Joints      Data    Recent Labs     19  1515 19  0530 19  0555   WBC 9.9 6.1 7.4   HGB 12.3* 10.9* 12.2*   HCT 38.2 34.4* 37.6    165 183      Recent Labs     19  1515 19  0530 19  0555    142 142   K 3.5 3.6 3.6    105 103   CO2 35* 25 29   BUN 25* 25* 16   CREATININE 1.2* 0.9 0.9     Recent Labs     19  1515 19  0530   AST 17 17   ALT 10 10   BILITOT 1.0 0.8   ALKPHOS 86 74         Electronically signed by Kristie Shabazz MD on 2019 at 2:16 PM      Comment: Please note this report has been produced using speech recognition software and may contain errors related to that system including errors in grammar, punctuation, and spelling, as well as words and phrases that may be inappropriate.  If there are any questions or concerns please feel free to contact the dictating provider for clarification

## 2019-04-09 NOTE — FLOWSHEET NOTE
Pt daughter came to nurses station asking about discharging directly to [facility] she reports she thinks that would be better than going home and then going. This nurse told her I could update Zach Pizarro, updated and aware will speak with Dr. Gilberto Walker. She then started walking away and then came back to say \"I would like it noted in her chart that she is very confused today she is talking out of her head and not making sense. \" Primary nurse Zamzam Quijano at nurses station aware of conversation.

## 2019-04-09 NOTE — CARE COORDINATION
NURSING: The patient has been accepted at [facility] but they cannot accept the patient until Wednesday 4/10. Once the physician has placed the discharge order please call report, complete the [address] form, and arrange transportation.

## 2019-04-10 VITALS
SYSTOLIC BLOOD PRESSURE: 145 MMHG | TEMPERATURE: 97.8 F | RESPIRATION RATE: 16 BRPM | HEIGHT: 67 IN | WEIGHT: 144.9 LBS | OXYGEN SATURATION: 97 % | BODY MASS INDEX: 22.74 KG/M2 | HEART RATE: 94 BPM | DIASTOLIC BLOOD PRESSURE: 71 MMHG

## 2019-04-10 PROBLEM — N30.00 ACUTE CYSTITIS WITHOUT HEMATURIA: Status: RESOLVED | Noted: 2019-04-10 | Resolved: 2019-04-10

## 2019-04-10 PROBLEM — N30.00 ACUTE CYSTITIS WITHOUT HEMATURIA: Status: ACTIVE | Noted: 2019-04-10
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
## 2019-04-10 PROBLEM — R29.898 WEAKNESS OF BOTH LOWER EXTREMITIES: Status: ACTIVE | Noted: 2019-04-10

## 2019-04-10 LAB
CULTURE: ABNORMAL
INR BLD: 1.92 INDEX
Lab: ABNORMAL
PROTHROMBIN TIME: 21.7 SECONDS (ref 9.12–12.5)
SPECIMEN: ABNORMAL
TOTAL COLONY COUNT: ABNORMAL

## 2019-04-10 PROCEDURE — 2580000003 HC RX 258: Performed by: FAMILY MEDICINE

## 2019-04-10 PROCEDURE — 85610 PROTHROMBIN TIME: CPT

## 2019-04-10 PROCEDURE — 6370000000 HC RX 637 (ALT 250 FOR IP): Performed by: FAMILY MEDICINE

## 2019-04-10 PROCEDURE — 96376 TX/PRO/DX INJ SAME DRUG ADON: CPT

## 2019-04-10 PROCEDURE — 36415 COLL VENOUS BLD VENIPUNCTURE: CPT

## 2019-04-10 PROCEDURE — G0378 HOSPITAL OBSERVATION PER HR: HCPCS

## 2019-04-10 PROCEDURE — 6360000002 HC RX W HCPCS: Performed by: FAMILY MEDICINE

## 2019-04-10 RX ORDER — WARFARIN SODIUM 5 MG/1
5 TABLET ORAL
Status: DISCONTINUED | OUTPATIENT
Start: 2019-04-10 | End: 2019-04-10 | Stop reason: HOSPADM

## 2019-04-10 RX ADMIN — FAMOTIDINE 20 MG: 20 TABLET ORAL at 09:20

## 2019-04-10 RX ADMIN — CEFTRIAXONE SODIUM 1 G: 1 INJECTION, POWDER, FOR SOLUTION INTRAMUSCULAR; INTRAVENOUS at 11:10

## 2019-04-10 RX ADMIN — SODIUM CHLORIDE: 9 INJECTION, SOLUTION INTRAVENOUS at 03:45

## 2019-04-10 RX ADMIN — CELECOXIB 200 MG: 100 CAPSULE ORAL at 09:20

## 2019-04-10 RX ADMIN — TRIAMTERENE AND HYDROCHLOROTHIAZIDE 1 TABLET: 37.5; 25 TABLET ORAL at 09:20

## 2019-04-10 RX ADMIN — ATORVASTATIN CALCIUM 10 MG: 10 TABLET, FILM COATED ORAL at 09:20

## 2019-04-10 RX ADMIN — METOPROLOL TARTRATE 50 MG: 50 TABLET ORAL at 09:20

## 2019-04-10 ASSESSMENT — PAIN SCALES - GENERAL
PAINLEVEL_OUTOF10: 0

## 2019-04-10 NOTE — DISCHARGE SUMMARY
Efren Castillo 5/29/1929 [id number]  PCP:  Ananda Campos MD    Admit date: 4/7/2019  Admitting Physician: Flor Platt MD    Discharge date: 4/10/2019 Discharge Physician: Marilee Banegas MD      Reason for admission:   Chief Complaint   Patient presents with    Fall     fell sideways from toilet. Unknown how long was down. Found by family. son states pt c/o lower back pain. No c/o pain at this time. Present on Admission:   (Resolved) Fall   Atrial fibrillation ([address]   HTN (hypertension)   (Resolved) Acute cystitis without hematuria   Weakness of both lower extremities       Discharge Diagnoses Include:  1. Dementia  2. Atrial fibrillation  3. Chronic anticoagulation  4. Hypertension  5. Physical debility    Hospital Course[de-identified] Patient is admitted to the hospital with a fall which was related to weakness and physical debility, patient was found to urinary tract infection and generalized weakness. She was treated for the same and as patient continued to have weakness she was transitioned to a nursing facility for continued rehab. Patient was given 3 doses of IV Rocephin for urinary tract infection and she is to follow-up in the nursing home with a repeat UA.   Her hypertensive medications were resumed without any changes to the doses     BP (!) 145/71   Pulse 94   Temp 97.8 °F (36.6 °C) (Temporal)   Resp 16   Ht 5' 7\" (1.702 m)   Wt 144 lb 14.4 oz (65.7 kg)   SpO2 97%   BMI 22.69 kg/m²         Head: atraumatic & normocephalic  Ears: TM's bialterall normal with normal canals  Eyes: without pallor or icterus  Oral cavity: moist mucous membranes with normal dentition  Neck: supple with no lymphadenopathy, JVD down, trachea central  CVS: normal S1S2 with no additional heart sounds  Respi: good air entry in both lung fields with no other adventious breath sounds  GI: soft & non tender, with +ve bowel sounds, no guarding ,rigidity or rebound tenderness  : no inguinal lymphadenopathy, no CVA tenderness  CNS: no focal weakness or sensory deficits peripherally, DTR's equal bilaterally, CN2-12 normal  Skin: no rash, purpurea or eruptions  MS: normal muscle strength, normal ROM in all major joints      Procedures:  None significant    Significant Diagnostic Studies at discharge:   As above    Patient Instructions:   Laura Argueta   Home Medication Instructions [facility]:[id number]    Printed on:04/10/19 3379   Medication Information                      acetaminophen (TYLENOL) 500 MG tablet  Take 500 mg by mouth 2 times daily             Calcium Carbonate-Vitamin D (CALCIUM + D PO)  Take 600 mg by mouth daily. celecoxib (CELEBREX) 200 MG capsule  Take 200 mg by mouth daily. metoprolol (LOPRESSOR) 50 MG tablet  Take 50 mg by mouth daily. Take 1/2 tablet daily             therapeutic multivitamin-minerals (THERAGRAN-M) tablet  Take 1 tablet by mouth daily. triamterene-hydrochlorothiazide (MAXZIDE-25) 37.5-25 MG per tablet  Take 1 tablet by mouth Indications: Monday, Wednesday, Friday              Warfarin Sodium (COUMADIN PO)  Take by mouth daily Indications: Monday, Wedenesday, Friday 5 mg; alternate days 2.5 mg 5 mg, 4 mg alternant                   Code Status: Full Code     Consults: , PT/OT    Diet: Gen. Activity: As tolerated   Work:    Discharged Condition: Stable    Prognosis: Fair    Disposition: Skilled nursing facility      Follow-up with   1. PCP within   2-3    Days    Follow up labs: BMP and UA       Discharge Physician Signed: Saad Mcintosh M.D.       Time spent on discharge in the examination, evaluation, counseling and review of medications and discharge plan: 25 minutes    Electronically signed by Saad Mcintosh MD on 4/10/2019 at 9:21 AM      Comment: Please note this report has been produced using speech recognition software and may contain errors related to that system including errors in grammar, punctuation, and spelling, as well as words and phrases that may be inappropriate.  If there are any questions or concerns please feel free to contact the dictating provider for clarification

## 2019-04-11 ENCOUNTER — OUTSIDE SERVICES (OUTPATIENT)
Dept: INTERNAL MEDICINE CLINIC | Age: 84
End: 2019-04-11
Payer: MEDICARE
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
## 2019-04-11 DIAGNOSIS — I10 ESSENTIAL HYPERTENSION: ICD-10-CM

## 2019-04-11 DIAGNOSIS — R53.1 GENERALIZED WEAKNESS: Primary | ICD-10-CM

## 2019-04-11 DIAGNOSIS — I48.91 ATRIAL FIBRILLATION, UNSPECIFIED TYPE (HCC): ICD-10-CM
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
## 2019-04-11 LAB
EKG DIAGNOSIS: NORMAL
EKG Q-T INTERVAL: 340 MS
EKG QRS DURATION: 88 MS
EKG QTC CALCULATION (BAZETT): 482 MS
EKG R AXIS: 103 DEGREES
EKG T AXIS: 88 DEGREES
EKG VENTRICULAR RATE: 121 BPM

## 2019-04-11 PROCEDURE — 99305 1ST NF CARE MODERATE MDM 35: CPT | Performed by: INTERNAL MEDICINE

## 2019-04-11 NOTE — PROGRESS NOTES
History and Physical   ___________________________    Shad Peña's  is 1929  SEEN ON 2019 at [facility]  ___________________________    S:  Patient is seen today and discussed with the nurses. Patient was admitted to the hospital after a fall. As per notes she was getting progressively weak in the last 2 weeks and she went to the bathroom and fell. Patient's son found her in the bathroom. Patient was not sure if she had hit her head. She was on anticoagulation with Coumadin. CT scan of the head was negative. X-ray of the cervical spine and lumbar spine showed no fractures. She had a spinal stenosis. Patient has chronic atrial fibrillation and is taking Coumadin  She was also found to have UTI and received Rocephin IM for 3 days. Denies any chest pain, shortness of breath or cough   Denies nausea vomiting or diarrhea. No abdominal pain  Patient is hallucinating some times per nurses off and on. ALLERGIES:  Allergies   Allergen Reactions    Neomycin     Pcn [Penicillins]      Fungal infection    Pneumococcal Vaccines      Skin irritation        PAST MEDICAL HISTORY:    has a past medical history of Arrhythmia, Arthritis, H/O blood clots,HTN (hypertension), and Hyperlipidemia. History of atrial fibrillation        PAST SURGICAL HISTORY:   has a past surgical history that includes Total knee arthroplasty (); knee surgery (); Hysterectomy; and Hip Arthroplasty (Left, 2013). SOCIAL HISTORY:   reports that she has never smoked. She has never used smokeless tobacco. She reports that she does not drink alcohol or use drugs. Vital signs: /62. Temp 97.1. Pulse 77. RR 18.  O2 sat 96%. Weight 149.8 pounds  GENERAL: - Alert, oriented, pleasant, in no apparent distress. HEENT: - Conjunctiva pink, no scleral icterus. ENT clear. NECK: - Supple.   No jugular venous distention noted. No masses felt,  CARDIOVASCULAR: - Normal S1 and S2    PULMONARY: - No respiratory distress. No wheezes or rales. ABDOMEN: - Soft and non-tender,no masses  or organomegaly. EXTREMITIES: - No cyanosis, clubbing, or significant edema. SKIN: Skin is warm and dry. NEUROLOGICAL: - Cranial nerves II through XII are grossly intact. Patient knows her age. Patient seems reluctant to answer the questions the patient has gait disturbance because of hip problems. Assessment:  .  Generalized weakness and functional decline   . History of fall   . Chronic atrial fibrillation   . Degenerative spine. .  Chronic anticoagulation with Coumadin   . Gait disturbance   . Hypertension   . Hyperlipidemia        Plan:  Patient has generalized weakness and has difficulty ambulating. Had fall. Will consult PT and OT  Medication were reviewed. Coumadin 5 mg alternating with 2.5 mg. Tylenol 500 mg twice a day, calcium 600 mg with vitamin D, Celebrex 200 mg daily, Lopressor 50 mg half a tablet daily, multivitamin, Maxide 25 mg on Monday Wednesday and Friday. Patient used to take Lipitor and lisinopril that was discontinued in the hospital.  Medications were reviewed. Discontinue Celebrex. Will check PT/INR every week  Patient was having some hallucination per nurse earlier this morning.   We will recheck a UA

## 2019-04-12 ENCOUNTER — HOSPITAL ENCOUNTER (OUTPATIENT)
Age: 84
Discharge: HOME | End: 2019-04-12
Payer: MEDICARE

## 2019-04-12 LAB
BACTERIA: NEGATIVE /HPF
BILIRUBIN URINE: NEGATIVE MG/DL
BLOOD, URINE: NEGATIVE
CAST TYPE: ABNORMAL /HPF
CLARITY: CLEAR
COLOR: YELLOW
CRYSTAL TYPE: ABNORMAL /HPF
EPITHELIAL CELLS, UA: ABNORMAL /HPF
GLUCOSE, URINE: NEGATIVE MG/DL
KETONES, URINE: NEGATIVE MG/DL
LEUKOCYTE ESTERASE, URINE: NEGATIVE
MUCUS: NEGATIVE HPF
NITRITE URINE, QUANTITATIVE: NEGATIVE
PH, URINE: 8 (ref 5–8)
PROTEIN UA: NEGATIVE MG/DL
RBC URINE: ABNORMAL /HPF (ref 0–6)
SPECIFIC GRAVITY UA: 1.02 (ref 1–1.03)
UROBILINOGEN, URINE: 0.2 MG/DL (ref 0.2–1)
VOLUME, (UVOL): 12 ML (ref 10–12)
WBC UA: ABNORMAL /HPF (ref 0–5)

## 2019-04-12 PROCEDURE — 81001 URINALYSIS AUTO W/SCOPE: CPT

## 2019-04-18 ENCOUNTER — HOSPITAL ENCOUNTER (OUTPATIENT)
Age: 84
Discharge: HOME | End: 2019-04-18
Payer: MEDICARE

## 2019-04-18 LAB
ALBUMIN SERPL-MCNC: 4 GM/DL (ref 3.4–5)
ALP BLD-CCNC: 86 IU/L (ref 40–129)
ALT SERPL-CCNC: 7 U/L (ref 10–40)
ANION GAP SERPL CALCULATED.3IONS-SCNC: 12 MMOL/L (ref 4–16)
AST SERPL-CCNC: 13 IU/L (ref 15–37)
BILIRUB SERPL-MCNC: 0.7 MG/DL (ref 0–1)
BUN BLDV-MCNC: 21 MG/DL (ref 6–23)
CALCIUM SERPL-MCNC: 9.9 MG/DL (ref 8.3–10.6)
CHLORIDE BLD-SCNC: 99 MMOL/L (ref 99–110)
CO2: 30 MMOL/L (ref 21–32)
CREAT SERPL-MCNC: 0.9 MG/DL (ref 0.6–1.1)
GFR AFRICAN AMERICAN: >60 ML/MIN/1.73M2
GFR NON-AFRICAN AMERICAN: 59 ML/MIN/1.73M2
GLUCOSE BLD-MCNC: 108 MG/DL (ref 70–99)
HCT VFR BLD CALC: 40.5 % (ref 37–47)
HEMOGLOBIN: 13.2 GM/DL (ref 12.5–16)
INR BLD: 1.51 INDEX
MCH RBC QN AUTO: 29.1 PG (ref 27–31)
MCHC RBC AUTO-ENTMCNC: 32.6 % (ref 32–36)
MCV RBC AUTO: 89.2 FL (ref 78–100)
PDW BLD-RTO: 13.5 % (ref 11.7–14.9)
PLATELET # BLD: 321 K/CU MM (ref 140–440)
PMV BLD AUTO: 9.5 FL (ref 7.5–11.1)
POTASSIUM SERPL-SCNC: 3.8 MMOL/L (ref 3.5–5.1)
PROTHROMBIN TIME: 17.2 SECONDS (ref 9.12–12.5)
RBC # BLD: 4.54 M/CU MM (ref 4.2–5.4)
SODIUM BLD-SCNC: 141 MMOL/L (ref 135–145)
TOTAL PROTEIN: 6.8 GM/DL (ref 6.4–8.2)
WBC # BLD: 7.3 K/CU MM (ref 4–10.5)

## 2019-04-18 PROCEDURE — 85027 COMPLETE CBC AUTOMATED: CPT

## 2019-04-18 PROCEDURE — 80053 COMPREHEN METABOLIC PANEL: CPT

## 2019-04-18 PROCEDURE — 85610 PROTHROMBIN TIME: CPT

## 2019-04-18 PROCEDURE — 36415 COLL VENOUS BLD VENIPUNCTURE: CPT

## 2019-04-22 ENCOUNTER — HOSPITAL ENCOUNTER (OUTPATIENT)
Age: 84
Discharge: HOME | End: 2019-04-22
Payer: MEDICARE

## 2019-04-22 LAB
INR BLD: 1.16 INDEX
PROTHROMBIN TIME: 13.2 SECONDS (ref 9.12–12.5)

## 2019-04-22 PROCEDURE — 36415 COLL VENOUS BLD VENIPUNCTURE: CPT

## 2019-04-22 PROCEDURE — 85610 PROTHROMBIN TIME: CPT

## 2019-04-29 ENCOUNTER — HOSPITAL ENCOUNTER (OUTPATIENT)
Age: 84
Discharge: HOME | End: 2019-04-29
Payer: MEDICARE

## 2019-04-29 LAB
INR BLD: 1.51 INDEX
PROTHROMBIN TIME: 17.2 SECONDS (ref 9.12–12.5)

## 2019-04-29 PROCEDURE — 85610 PROTHROMBIN TIME: CPT

## 2019-04-29 PROCEDURE — 36415 COLL VENOUS BLD VENIPUNCTURE: CPT

## 2019-04-30 ENCOUNTER — OUTSIDE SERVICES (OUTPATIENT)
Dept: INTERNAL MEDICINE CLINIC | Age: 84
End: 2019-04-30
Payer: MEDICARE
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
## 2019-04-30 DIAGNOSIS — R53.1 GENERALIZED WEAKNESS: ICD-10-CM
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
## 2019-04-30 DIAGNOSIS — I48.91 ATRIAL FIBRILLATION, UNSPECIFIED TYPE (HCC): ICD-10-CM

## 2019-04-30 DIAGNOSIS — E78.2 MIXED HYPERLIPIDEMIA: ICD-10-CM

## 2019-04-30 PROCEDURE — 99308 SBSQ NF CARE LOW MDM 20: CPT | Performed by: INTERNAL MEDICINE

## 2019-05-01 NOTE — PROGRESS NOTES
Progress note  ___________________________    Lolita Prader Pateint's  is 1929  SEEN ON 2019 at [facility]  ___________________________    S:  Patient is seen today and discussed with the nurses. Patient was admitted to the nursing home because of weakness after the fall. She is feeling better now. Patient has chronic atrial fibrillation and is taking Coumadin  Denies any chest pain, shortness of breath or cough  Denies nausea vomiting or diarrhea. No abdominal pain. No urinary symptoms. Hallucinations are better. ALLERGIES:  Allergies   Allergen Reactions    Neomycin     Pcn [Penicillins]      Fungal infection    Pneumococcal Vaccines      Skin irritation        PAST MEDICAL HISTORY:    has a past medical history of Arrhythmia, Arthritis, H/O blood clots,HTN (hypertension), and Hyperlipidemia. History of atrial fibrillation        PAST SURGICAL HISTORY:   has a past surgical history that includes Total knee arthroplasty (); knee surgery (); Hysterectomy; and Hip Arthroplasty (Left, 2013). SOCIAL HISTORY:   reports that she has never smoked. She has never used smokeless tobacco. She reports that she does not drink alcohol or use drugs. Vital signs: /71. Temp 98.8. Pulse 79. RR 16.  O2 sat 97% on room air. Weight 149 pounds  GENERAL: - Alert, oriented, pleasant, in no apparent distress. HEENT: - Conjunctiva pink, no scleral icterus. ENT clear. NECK: - Supple. No jugular venous distention noted. No masses felt,  CARDIOVASCULAR: - Normal S1 and S2    PULMONARY: - No respiratory distress. No wheezes or rales. ABDOMEN: - Soft and non-tender,no masses  or organomegaly. EXTREMITIES: - No cyanosis, clubbing, or significant edema. SKIN: Skin is warm and dry.    NEUROLOGICAL: - Cranial nerves II through XII are grossly intact, patient is sitting in the wheelchair. Assessment:  .  Generalized weakness has improved. Tracy Jean History of fall   . Chronic atrial fibrillation   . Degenerative spine. .  Chronic anticoagulation with Coumadin   . Gait disturbance   . Hypertension   . Hyperlipidemia        Plan:  Continue PT. Medication were reviewed. INR is low today, it is 1.51. Increase warfarin to 6 mg daily.   Recheck PT/INR once a week  CMP is normal, CBC is also normal.  Urinalysis was normal.

## 2019-05-06 ENCOUNTER — HOSPITAL ENCOUNTER (OUTPATIENT)
Age: 84
Discharge: HOME | End: 2019-05-06
Payer: MEDICARE

## 2019-05-06 LAB
INR BLD: 3.02 INDEX
PROTHROMBIN TIME: 34.1 SECONDS (ref 9.12–12.5)

## 2019-05-06 PROCEDURE — 36415 COLL VENOUS BLD VENIPUNCTURE: CPT

## 2019-05-06 PROCEDURE — 85610 PROTHROMBIN TIME: CPT

## 2019-05-13 ENCOUNTER — HOSPITAL ENCOUNTER (OUTPATIENT)
Age: 84
Discharge: HOME | End: 2019-05-13
Payer: MEDICARE

## 2019-05-13 LAB
INR BLD: 4.81 INDEX
PROTHROMBIN TIME: 54 SECONDS (ref 9.12–12.5)

## 2019-05-13 PROCEDURE — 85610 PROTHROMBIN TIME: CPT

## 2019-05-13 PROCEDURE — 36415 COLL VENOUS BLD VENIPUNCTURE: CPT

## 2019-05-14 ENCOUNTER — HOSPITAL ENCOUNTER (OUTPATIENT)
Age: 84
Discharge: HOME | End: 2019-05-14
Payer: MEDICARE

## 2019-05-14 LAB
INR BLD: 4.76 INDEX
PROTHROMBIN TIME: 53.4 SECONDS (ref 9.12–12.5)

## 2019-05-14 PROCEDURE — 85610 PROTHROMBIN TIME: CPT

## 2019-05-14 PROCEDURE — 36415 COLL VENOUS BLD VENIPUNCTURE: CPT

## 2019-05-15 ENCOUNTER — HOSPITAL ENCOUNTER (OUTPATIENT)
Age: 84
Discharge: HOME | End: 2019-05-15
Payer: MEDICARE

## 2019-05-15 LAB
INR BLD: 3.1 INDEX
PROTHROMBIN TIME: 34.9 SECONDS (ref 9.12–12.5)

## 2019-05-15 PROCEDURE — 36415 COLL VENOUS BLD VENIPUNCTURE: CPT

## 2019-05-15 PROCEDURE — 85610 PROTHROMBIN TIME: CPT

## 2019-05-22 ENCOUNTER — HOSPITAL ENCOUNTER (OUTPATIENT)
Age: 84
Discharge: HOME | End: 2019-05-22
Payer: MEDICARE

## 2019-05-22 LAB
INR BLD: 2.89 INDEX
PROTHROMBIN TIME: 32.6 SECONDS (ref 9.12–12.5)

## 2019-05-22 PROCEDURE — 36415 COLL VENOUS BLD VENIPUNCTURE: CPT

## 2019-05-22 PROCEDURE — 85610 PROTHROMBIN TIME: CPT

## 2019-05-24 ENCOUNTER — OUTSIDE SERVICES (OUTPATIENT)
Dept: INTERNAL MEDICINE CLINIC | Age: 84
End: 2019-05-24
Payer: MEDICARE

## 2019-05-24 DIAGNOSIS — I48.91 ATRIAL FIBRILLATION, UNSPECIFIED TYPE (HCC): Primary | ICD-10-CM
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
## 2019-05-24 DIAGNOSIS — E78.2 MIXED HYPERLIPIDEMIA: ICD-10-CM
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
## 2019-05-24 DIAGNOSIS — I10 ESSENTIAL HYPERTENSION: ICD-10-CM

## 2019-05-24 PROCEDURE — 99308 SBSQ NF CARE LOW MDM 20: CPT | Performed by: INTERNAL MEDICINE

## 2019-05-29 ENCOUNTER — HOSPITAL ENCOUNTER (OUTPATIENT)
Age: 84
Discharge: HOME | End: 2019-05-29
Payer: MEDICARE

## 2019-05-29 LAB
INR BLD: 2.3 INDEX
PROTHROMBIN TIME: 26 SECONDS (ref 9.12–12.5)

## 2019-05-29 PROCEDURE — 36415 COLL VENOUS BLD VENIPUNCTURE: CPT

## 2019-05-29 PROCEDURE — 85610 PROTHROMBIN TIME: CPT

## 2019-06-05 ENCOUNTER — HOSPITAL ENCOUNTER (OUTPATIENT)
Age: 84
Discharge: HOME | End: 2019-06-05
Payer: MEDICARE

## 2019-06-05 LAB
INR BLD: 1.66 INDEX
PROTHROMBIN TIME: 18.8 SECONDS (ref 9.12–12.5)

## 2019-06-05 PROCEDURE — 36415 COLL VENOUS BLD VENIPUNCTURE: CPT

## 2019-06-05 PROCEDURE — 85610 PROTHROMBIN TIME: CPT

## 2019-06-12 ENCOUNTER — HOSPITAL ENCOUNTER (OUTPATIENT)
Age: 84
Discharge: HOME | End: 2019-06-12
Payer: MEDICARE

## 2019-06-12 LAB
INR BLD: 3.13 INDEX
PROTHROMBIN TIME: 35.3 SECONDS (ref 9.12–12.5)

## 2019-06-12 PROCEDURE — 85610 PROTHROMBIN TIME: CPT

## 2019-06-12 PROCEDURE — 36415 COLL VENOUS BLD VENIPUNCTURE: CPT

## 2019-06-16 ENCOUNTER — HOSPITAL ENCOUNTER (OUTPATIENT)
Age: 84
Discharge: HOME | End: 2019-06-16
Payer: MEDICARE

## 2019-06-16 ENCOUNTER — HOSPITAL ENCOUNTER (OUTPATIENT)
Dept: GENERAL RADIOLOGY | Age: 84
Discharge: HOME | End: 2019-06-16
Payer: MEDICARE

## 2019-06-16 DIAGNOSIS — M25.562 ACUTE PAIN OF LEFT KNEE: ICD-10-CM

## 2019-06-16 PROCEDURE — 73560 X-RAY EXAM OF KNEE 1 OR 2: CPT

## 2019-06-19 ENCOUNTER — HOSPITAL ENCOUNTER (OUTPATIENT)
Age: 84
Discharge: HOME | End: 2019-06-19
Payer: MEDICARE

## 2019-06-19 LAB
INR BLD: 3.46 INDEX
PROTHROMBIN TIME: 39 SECONDS (ref 9.12–12.5)

## 2019-06-19 PROCEDURE — 36415 COLL VENOUS BLD VENIPUNCTURE: CPT

## 2019-06-19 PROCEDURE — 85610 PROTHROMBIN TIME: CPT

## 2019-06-26 ENCOUNTER — HOSPITAL ENCOUNTER (OUTPATIENT)
Age: 84
Discharge: HOME | End: 2019-06-26
Payer: MEDICARE

## 2019-06-26 LAB
INR BLD: 2.74 INDEX
PROTHROMBIN TIME: 30.9 SECONDS (ref 9.12–12.5)

## 2019-06-26 PROCEDURE — 85610 PROTHROMBIN TIME: CPT

## 2019-06-26 PROCEDURE — 36415 COLL VENOUS BLD VENIPUNCTURE: CPT

## 2019-07-03 ENCOUNTER — HOSPITAL ENCOUNTER (OUTPATIENT)
Age: 84
Discharge: HOME | End: 2019-07-03
Payer: MEDICARE

## 2019-07-03 LAB
INR BLD: 2.9 INDEX
PROTHROMBIN TIME: 32.7 SECONDS (ref 9.12–12.5)

## 2019-07-03 PROCEDURE — 85610 PROTHROMBIN TIME: CPT

## 2019-07-03 PROCEDURE — 36415 COLL VENOUS BLD VENIPUNCTURE: CPT

## 2019-07-10 ENCOUNTER — HOSPITAL ENCOUNTER (OUTPATIENT)
Age: 84
Discharge: HOME | End: 2019-07-10
Payer: MEDICARE

## 2019-07-10 LAB
INR BLD: 2.11 INDEX
INR BLD: 2.35 INDEX
PROTHROMBIN TIME: 23.9 SECONDS (ref 9.12–12.5)
PROTHROMBIN TIME: 26.6 SECONDS (ref 9.12–12.5)

## 2019-07-10 PROCEDURE — 36415 COLL VENOUS BLD VENIPUNCTURE: CPT

## 2019-07-10 PROCEDURE — 85610 PROTHROMBIN TIME: CPT

## 2019-07-17 ENCOUNTER — HOSPITAL ENCOUNTER (OUTPATIENT)
Age: 84
Discharge: HOME | End: 2019-07-17
Payer: MEDICARE

## 2019-07-17 LAB
INR BLD: 1.36 INDEX
PROTHROMBIN TIME: 15.5 SECONDS (ref 9.12–12.5)

## 2019-07-17 PROCEDURE — 85610 PROTHROMBIN TIME: CPT

## 2019-07-17 PROCEDURE — 36415 COLL VENOUS BLD VENIPUNCTURE: CPT

## 2020-01-01 ENCOUNTER — HOSPITAL ENCOUNTER (OUTPATIENT)
Age: 85
Setting detail: SPECIMEN
Discharge: HOME | End: 2020-06-17
Payer: MEDICARE

## 2020-01-01 ENCOUNTER — HOSPITAL ENCOUNTER (OUTPATIENT)
Age: 85
Setting detail: SPECIMEN
Discharge: HOME | End: 2020-02-12
Payer: MEDICARE

## 2020-01-01 ENCOUNTER — HOSPITAL ENCOUNTER (OUTPATIENT)
Age: 85
Setting detail: SPECIMEN
Discharge: HOME | End: 2020-04-08
Payer: MEDICARE

## 2020-01-01 ENCOUNTER — HOSPITAL ENCOUNTER (OUTPATIENT)
Dept: WOUND CARE | Age: 85
Discharge: HOME | End: 2020-07-01
Payer: MEDICARE

## 2020-01-01 ENCOUNTER — HOSPITAL ENCOUNTER (OUTPATIENT)
Age: 85
Setting detail: SPECIMEN
Discharge: HOME | End: 2020-05-20
Payer: MEDICARE

## 2020-01-01 ENCOUNTER — HOSPITAL ENCOUNTER (OUTPATIENT)
Age: 85
Discharge: HOME | End: 2020-04-09
Payer: MEDICARE

## 2020-01-01 ENCOUNTER — HOSPITAL ENCOUNTER (OUTPATIENT)
Age: 85
Setting detail: SPECIMEN
Discharge: HOME | End: 2020-06-03
Payer: MEDICARE

## 2020-01-01 ENCOUNTER — HOSPITAL ENCOUNTER (OUTPATIENT)
Age: 85
Setting detail: SPECIMEN
Discharge: HOME | End: 2020-02-26
Payer: MEDICARE

## 2020-01-01 ENCOUNTER — HOSPITAL ENCOUNTER (OUTPATIENT)
Age: 85
Setting detail: SPECIMEN
Discharge: HOME | End: 2020-02-05
Payer: MEDICARE

## 2020-01-01 ENCOUNTER — HOSPITAL ENCOUNTER (OUTPATIENT)
Age: 85
Setting detail: SPECIMEN
Discharge: HOME | End: 2020-07-22
Payer: MEDICARE

## 2020-01-01 ENCOUNTER — HOSPITAL ENCOUNTER (OUTPATIENT)
Age: 85
Setting detail: SPECIMEN
Discharge: HOME | End: 2020-06-15
Payer: MEDICARE

## 2020-01-01 ENCOUNTER — OUTSIDE SERVICES (OUTPATIENT)
Dept: INTERNAL MEDICINE CLINIC | Age: 85
End: 2020-01-01
Payer: MEDICARE

## 2020-01-01 ENCOUNTER — HOSPITAL ENCOUNTER (OUTPATIENT)
Dept: WOUND CARE | Age: 85
Discharge: HOME | End: 2020-07-09
Payer: MEDICARE

## 2020-01-01 ENCOUNTER — OUTSIDE SERVICES (OUTPATIENT)
Dept: INTERNAL MEDICINE CLINIC | Age: 85
End: 2020-01-01

## 2020-01-01 ENCOUNTER — HOSPITAL ENCOUNTER (OUTPATIENT)
Dept: GENERAL RADIOLOGY | Age: 85
Discharge: HOME | End: 2020-06-09
Payer: MEDICARE

## 2020-01-01 ENCOUNTER — HOSPITAL ENCOUNTER (OUTPATIENT)
Age: 85
Setting detail: SPECIMEN
Discharge: HOME | End: 2020-04-29
Payer: MEDICARE

## 2020-01-01 ENCOUNTER — HOSPITAL ENCOUNTER (OUTPATIENT)
Age: 85
Setting detail: SPECIMEN
Discharge: HOME | End: 2020-07-15
Payer: MEDICARE

## 2020-01-01 ENCOUNTER — HOSPITAL ENCOUNTER (OUTPATIENT)
Age: 85
Setting detail: SPECIMEN
Discharge: HOME | End: 2020-05-27
Payer: MEDICARE

## 2020-01-01 ENCOUNTER — HOSPITAL ENCOUNTER (OUTPATIENT)
Age: 85
Setting detail: SPECIMEN
Discharge: HOME | End: 2020-02-19
Payer: MEDICARE

## 2020-01-01 ENCOUNTER — HOSPITAL ENCOUNTER (OUTPATIENT)
Dept: WOUND CARE | Age: 85
Discharge: HOME | End: 2020-07-17
Payer: MEDICARE

## 2020-01-01 ENCOUNTER — HOSPITAL ENCOUNTER (OUTPATIENT)
Age: 85
Setting detail: SPECIMEN
Discharge: HOME | End: 2020-01-22
Payer: MEDICARE

## 2020-01-01 ENCOUNTER — HOSPITAL ENCOUNTER (OUTPATIENT)
Age: 85
Discharge: HOME | End: 2020-06-24
Payer: MEDICARE

## 2020-01-01 ENCOUNTER — HOSPITAL ENCOUNTER (OUTPATIENT)
Dept: ULTRASOUND IMAGING | Age: 85
Discharge: HOME | End: 2020-06-16
Payer: MEDICARE

## 2020-01-01 ENCOUNTER — HOSPITAL ENCOUNTER (OUTPATIENT)
Age: 85
Setting detail: SPECIMEN
Discharge: HOME | End: 2020-06-29
Payer: MEDICARE

## 2020-01-01 ENCOUNTER — HOSPITAL ENCOUNTER (OUTPATIENT)
Age: 85
Setting detail: SPECIMEN
Discharge: HOME | End: 2020-05-13
Payer: MEDICARE

## 2020-01-01 ENCOUNTER — TELEPHONE (OUTPATIENT)
Dept: INTERNAL MEDICINE CLINIC | Age: 85
End: 2020-01-01

## 2020-01-01 ENCOUNTER — HOSPITAL ENCOUNTER (OUTPATIENT)
Age: 85
Setting detail: SPECIMEN
Discharge: HOME | End: 2020-07-01
Payer: MEDICARE

## 2020-01-01 ENCOUNTER — HOSPITAL ENCOUNTER (OUTPATIENT)
Age: 85
Setting detail: SPECIMEN
Discharge: HOME | End: 2020-06-09
Payer: MEDICARE

## 2020-01-01 ENCOUNTER — HOSPITAL ENCOUNTER (OUTPATIENT)
Age: 85
Setting detail: SPECIMEN
Discharge: HOME | End: 2020-03-25
Payer: MEDICARE

## 2020-01-01 ENCOUNTER — HOSPITAL ENCOUNTER (OUTPATIENT)
Age: 85
Setting detail: SPECIMEN
Discharge: HOME | End: 2020-06-11
Payer: MEDICARE

## 2020-01-01 ENCOUNTER — HOSPITAL ENCOUNTER (OUTPATIENT)
Age: 85
Setting detail: SPECIMEN
Discharge: HOME | End: 2020-04-21
Payer: MEDICARE

## 2020-01-01 ENCOUNTER — HOSPITAL ENCOUNTER (OUTPATIENT)
Age: 85
Setting detail: SPECIMEN
Discharge: HOME | End: 2020-04-01
Payer: MEDICARE

## 2020-01-01 ENCOUNTER — HOSPITAL ENCOUNTER (OUTPATIENT)
Dept: GENERAL RADIOLOGY | Age: 85
Discharge: HOME | End: 2020-04-09
Payer: MEDICARE

## 2020-01-01 ENCOUNTER — HOSPITAL ENCOUNTER (OUTPATIENT)
Age: 85
Setting detail: SPECIMEN
Discharge: HOME | End: 2020-01-15
Payer: MEDICARE

## 2020-01-01 ENCOUNTER — HOSPITAL ENCOUNTER (OUTPATIENT)
Dept: MRI IMAGING | Age: 85
Discharge: HOME | End: 2020-06-16
Payer: MEDICARE

## 2020-01-01 ENCOUNTER — HOSPITAL ENCOUNTER (OUTPATIENT)
Age: 85
Setting detail: SPECIMEN
Discharge: HOME | End: 2020-01-08
Payer: MEDICARE

## 2020-01-01 ENCOUNTER — HOSPITAL ENCOUNTER (OUTPATIENT)
Age: 85
Setting detail: SPECIMEN
Discharge: HOME | End: 2020-07-08
Payer: MEDICARE

## 2020-01-01 ENCOUNTER — HOSPITAL ENCOUNTER (OUTPATIENT)
Age: 85
Discharge: HOME | End: 2020-03-11
Payer: MEDICARE

## 2020-01-01 ENCOUNTER — HOSPITAL ENCOUNTER (OUTPATIENT)
Age: 85
Setting detail: SPECIMEN
Discharge: HOME | End: 2020-03-18
Payer: MEDICARE

## 2020-01-01 ENCOUNTER — HOSPITAL ENCOUNTER (OUTPATIENT)
Age: 85
Setting detail: SPECIMEN
Discharge: HOME | End: 2020-05-28
Payer: MEDICARE

## 2020-01-01 ENCOUNTER — HOSPITAL ENCOUNTER (OUTPATIENT)
Age: 85
Setting detail: SPECIMEN
Discharge: HOME | End: 2020-04-15
Payer: MEDICARE

## 2020-01-01 ENCOUNTER — HOSPITAL ENCOUNTER (OUTPATIENT)
Age: 85
Discharge: HOME | End: 2020-06-09
Payer: MEDICARE

## 2020-01-01 ENCOUNTER — HOSPITAL ENCOUNTER (OUTPATIENT)
Age: 85
Setting detail: SPECIMEN
Discharge: HOME | End: 2020-03-04
Payer: MEDICARE

## 2020-01-01 ENCOUNTER — HOSPITAL ENCOUNTER (OUTPATIENT)
Dept: WOUND CARE | Age: 85
Discharge: HOME | End: 2020-06-24
Payer: MEDICARE

## 2020-01-01 ENCOUNTER — HOSPITAL ENCOUNTER (OUTPATIENT)
Age: 85
Setting detail: SPECIMEN
Discharge: HOME | End: 2020-01-29
Payer: MEDICARE

## 2020-01-01 ENCOUNTER — HOSPITAL ENCOUNTER (OUTPATIENT)
Dept: WOUND CARE | Age: 85
Discharge: HOME | End: 2020-06-17
Payer: MEDICARE

## 2020-01-01 ENCOUNTER — HOSPITAL ENCOUNTER (OUTPATIENT)
Age: 85
Setting detail: SPECIMEN
Discharge: HOME | End: 2020-05-06
Payer: MEDICARE

## 2020-01-01 DIAGNOSIS — Z79.01 CHRONIC ANTICOAGULATION: ICD-10-CM

## 2020-01-01 DIAGNOSIS — E78.2 MIXED HYPERLIPIDEMIA: ICD-10-CM

## 2020-01-01 DIAGNOSIS — I10 ESSENTIAL HYPERTENSION: ICD-10-CM

## 2020-01-01 DIAGNOSIS — I48.91 ATRIAL FIBRILLATION, UNSPECIFIED TYPE (HCC): Primary | ICD-10-CM

## 2020-01-01 LAB
ALBUMIN SERPL-MCNC: 3 GM/DL (ref 3.4–5)
ALP BLD-CCNC: 91 IU/L (ref 40–129)
ALT SERPL-CCNC: 7 U/L (ref 10–40)
ANION GAP SERPL CALCULATED.3IONS-SCNC: 17 MMOL/L (ref 4–16)
AST SERPL-CCNC: 13 IU/L (ref 15–37)
BILIRUB SERPL-MCNC: 0.4 MG/DL (ref 0–1)
BUN BLDV-MCNC: 10 MG/DL (ref 6–23)
CALCIUM SERPL-MCNC: 8.9 MG/DL (ref 8.3–10.6)
CHLORIDE BLD-SCNC: 98 MMOL/L (ref 99–110)
CO2: 21 MMOL/L (ref 21–32)
CREAT SERPL-MCNC: 0.8 MG/DL (ref 0.6–1.1)
CULTURE: ABNORMAL
CULTURE: ABNORMAL
GFR AFRICAN AMERICAN: >60 ML/MIN/1.73M2
GFR NON-AFRICAN AMERICAN: >60 ML/MIN/1.73M2
GLUCOSE BLD-MCNC: 94 MG/DL (ref 70–99)
HCT VFR BLD CALC: 33 % (ref 37–47)
HEMOGLOBIN: 10.3 GM/DL (ref 12.5–16)
INR BLD: 1.5 INDEX
INR BLD: 1.68 INDEX
INR BLD: 1.72 INDEX
INR BLD: 1.79 INDEX
INR BLD: 2.01 INDEX
INR BLD: 2.02 INDEX
INR BLD: 2.04 INDEX
INR BLD: 2.26 INDEX
INR BLD: 2.33 INDEX
INR BLD: 2.34 INDEX
INR BLD: 2.36 INDEX
INR BLD: 2.37 INDEX
INR BLD: 2.48 INDEX
INR BLD: 2.53 INDEX
INR BLD: 2.54 INDEX
INR BLD: 2.56 INDEX
INR BLD: 2.6 INDEX
INR BLD: 2.71 INDEX
INR BLD: 2.72 INDEX
INR BLD: 2.72 INDEX
INR BLD: 2.82 INDEX
INR BLD: 2.83 INDEX
INR BLD: 2.88 INDEX
INR BLD: 2.91 INDEX
INR BLD: 2.93 INDEX
INR BLD: 2.99 INDEX
INR BLD: 3.06 INDEX
INR BLD: 3.25 INDEX
INR BLD: 3.25 INDEX
INR BLD: 3.31 INDEX
INR BLD: 3.87 INDEX
Lab: ABNORMAL
MCH RBC QN AUTO: 26.8 PG (ref 27–31)
MCHC RBC AUTO-ENTMCNC: 31.2 % (ref 32–36)
MCV RBC AUTO: 85.9 FL (ref 78–100)
PDW BLD-RTO: 16.4 % (ref 11.7–14.9)
PLATELET # BLD: 262 K/CU MM (ref 140–440)
PMV BLD AUTO: 9.1 FL (ref 7.5–11.1)
POTASSIUM SERPL-SCNC: 3.5 MMOL/L (ref 3.5–5.1)
PROTHROMBIN TIME: 17.3 SECONDS (ref 11.7–14.5)
PROTHROMBIN TIME: 19.4 SECONDS (ref 11.7–14.5)
PROTHROMBIN TIME: 19.8 SECONDS (ref 11.7–14.5)
PROTHROMBIN TIME: 20.6 SECONDS (ref 11.7–14.5)
PROTHROMBIN TIME: 23.2 SECONDS (ref 11.7–14.5)
PROTHROMBIN TIME: 23.4 SECONDS (ref 11.7–14.5)
PROTHROMBIN TIME: 23.6 SECONDS (ref 11.7–14.5)
PROTHROMBIN TIME: 26.2 SECONDS (ref 11.7–14.5)
PROTHROMBIN TIME: 27 SECONDS (ref 11.7–14.5)
PROTHROMBIN TIME: 27.1 SECONDS (ref 11.7–14.5)
PROTHROMBIN TIME: 27.4 SECONDS (ref 11.7–14.5)
PROTHROMBIN TIME: 27.5 SECONDS (ref 11.7–14.5)
PROTHROMBIN TIME: 28.8 SECONDS (ref 11.7–14.5)
PROTHROMBIN TIME: 29.4 SECONDS (ref 11.7–14.5)
PROTHROMBIN TIME: 29.5 SECONDS (ref 11.7–14.5)
PROTHROMBIN TIME: 29.8 SECONDS (ref 11.7–14.5)
PROTHROMBIN TIME: 30.2 SECONDS (ref 11.7–14.5)
PROTHROMBIN TIME: 31.5 SECONDS (ref 11.7–14.5)
PROTHROMBIN TIME: 31.6 SECONDS (ref 11.7–14.5)
PROTHROMBIN TIME: 31.7 SECONDS (ref 11.7–14.5)
PROTHROMBIN TIME: 32.8 SECONDS (ref 11.7–14.5)
PROTHROMBIN TIME: 33 SECONDS (ref 11.7–14.5)
PROTHROMBIN TIME: 33.5 SECONDS (ref 11.7–14.5)
PROTHROMBIN TIME: 33.9 SECONDS (ref 11.7–14.5)
PROTHROMBIN TIME: 34.1 SECONDS (ref 11.7–14.5)
PROTHROMBIN TIME: 34.9 SECONDS (ref 11.7–14.5)
PROTHROMBIN TIME: 35.7 SECONDS (ref 11.7–14.5)
PROTHROMBIN TIME: 38 SECONDS (ref 11.7–14.5)
PROTHROMBIN TIME: 38 SECONDS (ref 11.7–14.5)
PROTHROMBIN TIME: 38.7 SECONDS (ref 11.7–14.5)
PROTHROMBIN TIME: 45.3 SECONDS (ref 11.7–14.5)
RBC # BLD: 3.84 M/CU MM (ref 4.2–5.4)
SARS-COV-2: NOT DETECTED
SODIUM BLD-SCNC: 136 MMOL/L (ref 135–145)
SOURCE: NORMAL
SPECIMEN: ABNORMAL
TOTAL PROTEIN: 5.8 GM/DL (ref 6.4–8.2)
WBC # BLD: 6.5 K/CU MM (ref 4–10.5)

## 2020-01-01 PROCEDURE — 85610 PROTHROMBIN TIME: CPT

## 2020-01-01 PROCEDURE — 99308 SBSQ NF CARE LOW MDM 20: CPT | Performed by: INTERNAL MEDICINE

## 2020-01-01 PROCEDURE — G2062 QUAL NONMD EST PT 11-20M: HCPCS | Performed by: NURSE PRACTITIONER

## 2020-01-01 PROCEDURE — 36415 COLL VENOUS BLD VENIPUNCTURE: CPT

## 2020-01-01 PROCEDURE — 73502 X-RAY EXAM HIP UNI 2-3 VIEWS: CPT

## 2020-01-01 PROCEDURE — 73630 X-RAY EXAM OF FOOT: CPT

## 2020-01-01 PROCEDURE — 87075 CULTR BACTERIA EXCEPT BLOOD: CPT

## 2020-01-01 PROCEDURE — 99213 OFFICE O/P EST LOW 20 MIN: CPT

## 2020-01-01 PROCEDURE — 93925 LOWER EXTREMITY STUDY: CPT

## 2020-01-01 PROCEDURE — 99212 OFFICE O/P EST SF 10 MIN: CPT

## 2020-01-01 PROCEDURE — 87070 CULTURE OTHR SPECIMN AEROBIC: CPT

## 2020-01-01 PROCEDURE — 99309 SBSQ NF CARE MODERATE MDM 30: CPT | Performed by: INTERNAL MEDICINE

## 2020-01-01 PROCEDURE — 87077 CULTURE AEROBIC IDENTIFY: CPT

## 2020-01-01 PROCEDURE — 85027 COMPLETE CBC AUTOMATED: CPT

## 2020-01-01 PROCEDURE — 99211 OFF/OP EST MAY X REQ PHY/QHP: CPT

## 2020-01-01 PROCEDURE — 80053 COMPREHEN METABOLIC PANEL: CPT

## 2020-01-01 PROCEDURE — U0002 COVID-19 LAB TEST NON-CDC: HCPCS

## 2020-01-31 PROBLEM — Z79.01 CHRONIC ANTICOAGULATION: Status: ACTIVE | Noted: 2020-01-01

## 2020-02-19 NOTE — PROGRESS NOTES
Progress note  ___________________________    Violeta Peña's  is 1929  SEEN ON 2020 at [facility]  ___________________________    S:    Pt is feeling well. Has no complaints  No chest pain. No SOB  No headaches. No NVD. Appetite is fair  No falls. No bleeding or bruising. Patient tolerating anticoagulants well. ALLERGIES:  Allergies   Allergen Reactions    Neomycin     Pcn [Penicillins]      Fungal infection    Pneumococcal Vaccines      Skin irritation        PAST MEDICAL HISTORY:    has a past medical history of Arrhythmia, Arthritis, H/O blood clots,HTN (hypertension), and Hyperlipidemia. History of atrial fibrillation        PAST SURGICAL HISTORY:   has a past surgical history that includes Total knee arthroplasty (); knee surgery (); Hysterectomy; and Hip Arthroplasty (Left, 2013). SOCIAL HISTORY:   reports that she has never smoked. She has never used smokeless tobacco. She reports that she does not drink alcohol or use drugs. Vital signs: /85. Temp 97.9. Pulse 86. RR 16.  O2 sat 96%  GENERAL: - Alert, pleasant, in no apparent distress. HEENT: - Conjunctiva pink, no scleral icterus. ENT clear. NECK: - Supple. No jugular venous distention noted. No masses felt,  CARDIOVASCULAR: - Normal S1 and S2    PULMONARY: - No respiratory distress. No wheezes or rales. ABDOMEN: - Soft and non-tender,no masses  or organomegaly. EXTREMITIES: - No cyanosis, clubbing, or significant edema. SKIN: Skin is warm and dry. NEUROLOGICAL: - Patient is awake and able to move all the extremities. Assessment:  .  Chronic atrial fibrillation   . Degenerative spine. .  Chronic anticoagulation with Coumadin   . Gait disturbance   . Hypertension   .   Hyperlipidemia        Plan:  Patient is getting PT and INR on a regular basis  Dose of Coumadin adjusted

## 2020-06-17 PROBLEM — I70.232 ATHEROSCLEROSIS OF NATIVE ARTERIES OF RIGHT LEG WITH ULCERATION OF CALF (HCC): Status: ACTIVE | Noted: 2020-01-01

## 2020-06-17 PROBLEM — I70.235 ATHEROSCLEROSIS OF NATIVE ARTERIES OF RIGHT LEG WITH ULCERATION OF OTHER PART OF FOOT (HCC): Status: ACTIVE | Noted: 2020-01-01

## 2020-06-17 PROBLEM — T14.8XXA DEEP TISSUE INJURY: Status: ACTIVE | Noted: 2020-01-01

## 2020-06-17 NOTE — PROGRESS NOTES
6/17/2020 11:06 AM   Undermining Starts ___ O'Clock 0 6/17/2020 11:06 AM   Undermining Ends___ O'Clock 0 6/17/2020 11:06 AM   Undermining Maxium Distance (cm) 0 6/17/2020 11:06 AM   Wound Assessment Black; Yellow 6/17/2020 11:06 AM   Drainage Amount Moderate 6/17/2020 11:06 AM   Drainage Description Serosanguinous 6/17/2020 11:06 AM   Odor None 6/17/2020 11:06 AM   Margins Defined edges 6/17/2020 11:06 AM   Jaja-wound Assessment Pink 6/17/2020 11:06 AM   Non-staged Wound Description Full thickness 6/17/2020 11:06 AM   [place]%Wound Bed 0 6/17/2020 11:06 AM   Red%Wound Bed 0 6/17/2020 11:06 AM   Yellow%Wound Bed 50 6/17/2020 11:06 AM   Black%Wound Bed 50 6/17/2020 11:06 AM   Purple%Wound Bed 0 6/17/2020 11:06 AM   Number of days: 0       Wound 06/17/20 #2 Right medial foot (Active)   Wound Length (cm) 1.5 cm 6/17/2020 11:06 AM   Wound Width (cm) 1 cm 6/17/2020 11:06 AM   Wound Depth (cm) 0.1 cm 6/17/2020 11:06 AM   Wound Surface Area (cm^2) 1.5 cm^2 6/17/2020 11:06 AM   Wound Volume (cm^3) 0.15 cm^3 6/17/2020 11:06 AM   Distance Tunneling (cm) 0 cm 6/17/2020 11:06 AM   Tunneling Position ___ O'Clock 0 6/17/2020 11:06 AM   Undermining Starts ___ O'Clock 0 6/17/2020 11:06 AM   Undermining Ends___ O'Clock 0 6/17/2020 11:06 AM   Undermining Maxium Distance (cm) 0 6/17/2020 11:06 AM   Wound Assessment Purple 6/17/2020 11:06 AM   Drainage Amount Moderate 6/17/2020 11:06 AM   Drainage Description Serosanguinous 6/17/2020 11:06 AM   Odor None 6/17/2020 11:06 AM   Margins Defined edges 6/17/2020 11:06 AM   Jaja-wound Assessment Red 6/17/2020 11:06 AM   [place]%Wound Bed 0 6/17/2020 11:06 AM   Red%Wound Bed 0 6/17/2020 11:06 AM   Yellow%Wound Bed 0 6/17/2020 11:06 AM   Black%Wound Bed 0 6/17/2020 11:06 AM   Purple%Wound Bed 100 6/17/2020 11:06 AM   Other%Wound Bed 0 6/17/2020 11:06 AM   Number of days: 0       Wound 06/17/20 # 3 Right lateral foot (Active)   Wound Length (cm) 4.5 cm 6/17/2020 11:06 AM   Wound Width (cm) 1.5 cm

## 2020-06-24 NOTE — PROGRESS NOTES
Images of the wound(s) is obtained and annotated along with completed description in [address] [place] [address]. []   3     Education Points   No Education completed by nursing staff.    []   0   Patient/caregiver is educated on 1-4 topics. Nursing staff identifies learner, confirms understanding of information (verbal, demonstration, written) and documents details. May include Discharge Instructions/AVS, available documents in My Chart, or Web-based learning. [x]   1   Patient/caregiver is educated on 5-9 topics. Nursing staff identifies learner, confirms understanding of information (verbal, demonstration, written) and documents details. May include Discharge Instructions/AVS, available documents in My Chart, or Web-based learning. []   2   Patient/caregiver is educated on 10 or more topics. Nursing staff identifies learner, confirms understanding of information (verbal, demonstration, written) and documents details. May include Discharge Instructions/AVS, available documents in My Chart, or Web-based learning. []   3     Follow-up Virtual Visit Points   No contact with outside resources made. []   0   Nursing staff contacts 1-2 outside resource. For example, telephone call made to home health, primary care provider, pharmacy, or DME. May include filling out forms and writing letters, arranging transportation, communication with insurance , vendors, etc.  Discharge, instructions and/or After Visit Summary given to patient/caregiver and instructions completed. []   1   Nursing staff contacts 3-4 outside resource. For example, telephone calls made to home health, primary care provider, pharmacy, or DME. May include filling out forms and writing letters, arranging transportation, communication with insurance , vendors, etc.  Discharge, instructions and/or After Visit Summary given to patient/caregiver and instructions completed. [x]   2   Nursing contacts 5 or more outside resource.  For example, telephone calls made to home health, primary care providers, pharmacy, or DME. May include filling out forms and writing letters, arranging transportation, communication with insurance , vendors, etc.  Discharge, instructions and/or After Visit Summary given to patient/caregiver and instructions completed.    []   3     Is this the Patient's First Visit to the [address] [place] [address] Road  No    Is this Patient Established @ [facility]  Yes             Virtual Visit Clinical Level of Care Wound Care      Points  1-3  Level 1 []     Points  4-5  Level 2 [x]     Points  6-7  Level 3 []     Points  8-9  Level 4 []     Points  10-11  Level 5 []       Electronically signed by Bettina Monsalve RN on 6/24/2020 at @NO  0

## 2020-06-24 NOTE — PROGRESS NOTES
Virtual Visit Via [facility]   Progress Note and Procedure Note    Jacque Beck  MEDICAL RECORD NUMBER:  [id number]  AGE: 80 y.o. GENDER: female  : 1929  EPISODE DATE:  2020    Subjective:     Chief Complaint   Patient presents with    Wound Check     leg        Consent obtained to communicate via Virtual Visit:  Yes     HISTORY of PRESENT ILLNESS HPI     Jacque Beck is a 80 y.o. female who presents today via Virtual Visit wound/ulcer evaluation. History of Wound Context:  arterial wound right lateral foot and shin and traumatic DTI from crossing her legs right medial foot, Louie boots and abductor brace in place. Wound/Ulcer Pain Timing/Severity: waxing and waning, moderate  Quality of pain: unable to verbalize  Severity:  unable to verbalize / 10   Modifying Factors:  Pain worsens with wound care, topical lidocaine ordered for comfort  Associated Signs/Symptoms: edema and pain    Ulcer Identification:  Ulcer Type: arterial and traumatic    Contributing Factors: chronic pressure, decreased mobility, shear force, arterial insufficiency and advanced age    Acute Wound: N/A    PAST MEDICAL HISTORY        Diagnosis Date    Arrhythmia     Arthritis     left hip disabling    H/O blood clots     H/O echocardiogram 13    Moderate concentric lvh with normal systolic function, mild to moderate MR and TR EF 50-55%          HTN (hypertension)     Hyperlipidemia        PAST SURGICAL HISTORY    Past Surgical History:   Procedure Laterality Date    HIP ARTHROPLASTY Left 2013    HYSTERECTOMY      KNEE SURGERY      right Total Knee    TOTAL KNEE ARTHROPLASTY      left       FAMILY HISTORY    History reviewed. No pertinent family history. SOCIAL HISTORY    Social History     Tobacco Use    Smoking status: Never Smoker    Smokeless tobacco: Never Used   Substance Use Topics    Alcohol use: No    Drug use:  No made aware of referral to set up appointment and transportation. The patient had VL arteries 6/16/2020  Impression   1. Abnormal waveforms diffusely in the right lower extremity beginning in the   superficial femoral artery, which could be related to decreased vascular   resistance from infection versus significant diffuse atherosclerotic disease. 2. Moderate stenosis in the distal right posterior tibial artery. 3. Mild stenosis in the distal left popliteal artery. 4. The mid and distal left peroneal artery is not visualized, which may be   related to vessel depth versus occlusion.           Written patient dismissal instructions available to Patient/POA       Discharge Instructions       [address]     NOTE: Upon discharge from the [address], you will receive a patient experience survey. We would be grateful if you would take the time to fill this survey out.     Wound care order history:                 DARIUS's   Right       Left               Date               Vascular studies:   Date               Imaging:   Date               Cultures:   Date               Labs/ HbA1c:   Date               Grafts:  Date               HBO:                Antibiotics:               Earlier Wound care treatments:                Authorizations:                        Consults:   Date                           PCP:      Continuing wound care orders and information:              Residence:               Continue home health care with:               Your wound-care supplies will be provided by: Luis PETERSEN provider:              Compression with              Off loading:  Date               Wound Meds:              Wound cleansing:                           Do not scrub or use excessive force. Wash hands with soap and water before and after dressing changes.                           Prior to applying a clean dressing, cleanse wound with normal saline,

## 2020-07-01 NOTE — PROGRESS NOTES
Images of the wound(s) is obtained and annotated along with completed description in [address] [place] [address]. []   3     Education Points   No Education completed by nursing staff.    []   0   Patient/caregiver is educated on 1-4 topics. Nursing staff identifies learner, confirms understanding of information (verbal, demonstration, written) and documents details. May include Discharge Instructions/AVS, available documents in My Chart, or Web-based learning. [x]   1   Patient/caregiver is educated on 5-9 topics. Nursing staff identifies learner, confirms understanding of information (verbal, demonstration, written) and documents details. May include Discharge Instructions/AVS, available documents in My Chart, or Web-based learning. []   2   Patient/caregiver is educated on 10 or more topics. Nursing staff identifies learner, confirms understanding of information (verbal, demonstration, written) and documents details. May include Discharge Instructions/AVS, available documents in My Chart, or Web-based learning. []   3     Follow-up Virtual Visit Points   No contact with outside resources made. []   0   Nursing staff contacts 1-2 outside resource. For example, telephone call made to home health, primary care provider, pharmacy, or DME. May include filling out forms and writing letters, arranging transportation, communication with insurance , vendors, etc.  Discharge, instructions and/or After Visit Summary given to patient/caregiver and instructions completed. [x]   1   Nursing staff contacts 3-4 outside resource. For example, telephone calls made to home health, primary care provider, pharmacy, or DME. May include filling out forms and writing letters, arranging transportation, communication with insurance , vendors, etc.  Discharge, instructions and/or After Visit Summary given to patient/caregiver and instructions completed. []   2   Nursing contacts 5 or more outside resource.  For example, telephone calls made to home health, primary care providers, pharmacy, or DME. May include filling out forms and writing letters, arranging transportation, communication with insurance , vendors, etc.  Discharge, instructions and/or After Visit Summary given to patient/caregiver and instructions completed.    []   3     Is this the Patient's First Visit to the [address] [place] [address] Road  No    Is this Patient Established @ [facility]  Yes             Virtual Visit Clinical Level of Care Wound Care      Points  1-3  Level 1 [x]     Points  4-5  Level 2 []     Points  6-7  Level 3 []     Points  8-9  Level 4 []     Points  10-11  Level 5 []       Electronically signed by Kam Palacios RN on 7/1/2020 at @NO

## 2020-07-02 NOTE — PROGRESS NOTES
Virtual Visit Via [employer]   Progress Note and Procedure Note    Zoltan Hutchins  MEDICAL RECORD NUMBER:  [id number]  AGE: 80 y.o. GENDER: female  : 1929  EPISODE DATE:  2020    Subjective:     Chief Complaint   Patient presents with    Wound Check     right foot/leg        Consent obtained to communicate via Virtual Visit:  Yes     HISTORY of PRESENT ILLNESS HPI     Zoltan Hutchins is a 80 y.o. female who presents today via Virtual Visit wound/ulcer evaluation. History of Wound Context: arterial wound right lateral foot and shin and traumatic DTI from crossing her legs right medial foot, Louie boots and abductor brace in place. Wound/Ulcer Pain Timing/Severity: unable to verbalize  Quality of pain: unable to verbalize  Severity:  unable to verbalize / 10   Modifying Factors: Pain worsens with wound care, topical lidocaine ordered for comfort  Associated Signs/Symptoms: edema and pain    Ulcer Identification:  Ulcer Type: arterial and traumatic    Contributing Factors: chronic pressure, decreased mobility, shear force, arterial insufficiency and advanced age      PAST MEDICAL HISTORY        Diagnosis Date    Arrhythmia     Arthritis     left hip disabling    H/O blood clots     H/O echocardiogram 13    Moderate concentric lvh with normal systolic function, mild to moderate MR and TR EF 50-55%          HTN (hypertension)     Hyperlipidemia        PAST SURGICAL HISTORY    Past Surgical History:   Procedure Laterality Date    HIP ARTHROPLASTY Left 2013    HYSTERECTOMY      KNEE SURGERY      right Total Knee    TOTAL KNEE ARTHROPLASTY      left       FAMILY HISTORY    History reviewed. No pertinent family history.     SOCIAL HISTORY    Social History     Tobacco Use    Smoking status: Never Smoker    Smokeless tobacco: Never Used   Substance Use Topics    Alcohol use: No    Drug use: No       ALLERGIES    Allergies   Allergen Reactions    Neomycin     Pcn [Penicillins]      Fungal infection    Pneumococcal Vaccines      Skin irritation       MEDICATIONS    Current Outpatient Medications on File Prior to Encounter   Medication Sig Dispense Refill    acetaminophen (TYLENOL) 500 MG tablet Take 500 mg by mouth 2 times daily      triamterene-hydrochlorothiazide (MAXZIDE-25) 37.5-25 MG per tablet Take 1 tablet by mouth Indications: Monday, Wednesday, Friday       celecoxib (CELEBREX) 200 MG capsule Take 200 mg by mouth daily.  metoprolol (LOPRESSOR) 50 MG tablet Take 50 mg by mouth daily. Take 1/2 tablet daily      therapeutic multivitamin-minerals (THERAGRAN-M) tablet Take 1 tablet by mouth daily.  Warfarin Sodium (COUMADIN PO) Take by mouth daily Indications: Monday, Wedenesday, Friday 5 mg; alternate days 2.5 mg 5 mg, 4 mg alternant       Calcium Carbonate-Vitamin D (CALCIUM + D PO) Take 600 mg by mouth daily. No current facility-administered medications on file prior to encounter. REVIEW OF SYSTEMS    Pertinent items are noted in HPI.     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 [address]          Performed by: DEBBIE Mccarthy - CNP    Wound/Ulcer       Wound 06/17/20 #1 Right anterior lower leg (Active)   Wound Length (cm) 4 cm 7/1/2020 11:37 AM   Wound Width (cm) 0.5 cm 7/1/2020 11:37 AM   Wound Depth (cm) 0.1 cm 7/1/2020 11:37 AM   Wound Surface Area (cm^2) 2 cm^2 7/1/2020 11:37 AM   Change in Wound Size % (l*w) 50 7/1/2020 11:37 AM   Wound Volume (cm^3) 0.2 cm^3 7/1/2020 11:37 AM   Wound Healing % 50 7/1/2020 11:37 AM   Distance Tunneling (cm) 0 cm 6/17/2020 11:06 AM   Tunneling Position ___ O'Clock 0 6/17/2020 11:06 AM   Undermining Starts ___ O'Clock 0 6/17/2020 11:06 AM   Undermining Ends___ O'Clock 0 6/17/2020 11:06 AM   Undermining Maxium Distance (cm) 0 6/17/2020 11:06 AM   Wound Assessment Black; Yellow 6/17/2020 11:06 AM   Drainage Amount Moderate 6/17/2020 11:06 AM   Drainage Description Serosanguinous 6/17/2020 11:06 AM   Odor None 6/17/2020 11:06 AM   Margins Defined edges 6/17/2020 11:06 AM   Jaja-wound Assessment Pink 6/17/2020 11:06 AM   Non-staged Wound Description Full thickness 6/17/2020 11:06 AM   Mabel%Wound Bed 0 6/17/2020 11:06 AM   Red%Wound Bed 0 6/17/2020 11:06 AM   Yellow%Wound Bed 50 6/17/2020 11:06 AM   Black%Wound Bed 50 6/17/2020 11:06 AM   Purple%Wound Bed 0 6/17/2020 11:06 AM   Number of days: 15       Wound 06/17/20 #2 Right medial foot (Active)   Wound Length (cm) 1 cm 7/1/2020 11:37 AM   Wound Width (cm) 1.5 cm 7/1/2020 11:37 AM   Wound Depth (cm) 0.1 cm 7/1/2020 11:37 AM   Wound Surface Area (cm^2) 1.5 cm^2 7/1/2020 11:37 AM   Change in Wound Size % (l*w) 0 7/1/2020 11:37 AM   Wound Volume (cm^3) 0.15 cm^3 7/1/2020 11:37 AM   Wound Healing % 0 7/1/2020 11:37 AM   Distance Tunneling (cm) 0 cm 6/17/2020 11:06 AM   Tunneling Position ___ O'Clock 0 6/17/2020 11:06 AM   Undermining Starts ___ O'Clock 0 6/17/2020 11:06 AM   Undermining Ends___ O'Clock 0 6/17/2020 11:06 AM   Undermining Maxium Distance (cm) 0 6/17/2020 11:06 AM   Wound Assessment Purple 6/17/2020 11:06 AM   Drainage Amount Moderate 6/17/2020 11:06 AM   Drainage Description Serosanguinous 6/17/2020 11:06 AM   Odor None 6/17/2020 11:06 AM   Margins Defined edges 6/17/2020 11:06 AM   Jaja-wound Assessment Red 6/17/2020 11:06 AM   Mabel%Wound Bed 0 6/17/2020 11:06 AM   Red%Wound Bed 0 6/17/2020 11:06 AM   Yellow%Wound Bed 0 6/17/2020 11:06 AM   Black%Wound Bed 0 6/17/2020 11:06 AM   Purple%Wound Bed 100 6/17/2020 11:06 AM   Other%Wound Bed 0 6/17/2020 11:06 AM   Number of days: 15       Wound 06/17/20 # 3 Right lateral foot (Active)   Wound Length (cm) 2.5 cm 7/1/2020 11:37 AM   Wound Width (cm) 4 cm 7/1/2020 11:37 AM   Wound Depth (cm) 0.1 cm 7/1/2020 11:37 AM   Wound Surface Area (cm^2) 10 cm^2 7/1/2020 11:37 AM   Change in Wound Size % (l*w) -48.15 7/1/2020 11:37 AM   Wound Volume (cm^3) 1 cm^3 7/1/2020 11:37 AM   Wound Healing % -47 7/1/2020 11:37 AM   Distance Tunneling (cm) 0 cm 6/17/2020 11:06 AM   Tunneling Position ___ O'Clock 0 6/17/2020 11:06 AM   Undermining Starts ___ O'Clock 0 6/17/2020 11:06 AM   Undermining Ends___ O'Clock 0 6/17/2020 11:06 AM   Undermining Maxium Distance (cm) 0 6/17/2020 11:06 AM   Wound Assessment Black 6/17/2020 11:06 AM   Drainage Amount Moderate 6/17/2020 11:06 AM   Drainage Description Serosanguinous 6/17/2020 11:06 AM   Odor None 6/17/2020 11:06 AM   Margins Defined edges 6/17/2020 11:06 AM   Jaja-wound Assessment Pink 6/17/2020 11:06 AM   Non-staged Wound Description Full thickness 6/17/2020 11:06 AM   [place]%Wound Bed 0 6/17/2020 11:06 AM   Red%Wound Bed 0 6/17/2020 11:06 AM   Yellow%Wound Bed 0 6/17/2020 11:06 AM   Black%Wound Bed 100 6/17/2020 11:06 AM   Purple%Wound Bed 0 6/17/2020 11:06 AM   Other%Wound Bed 0 6/17/2020 11:06 AM   Number of days: 15              Plan:     Please see attached Discharge Instructions    Based upon this virtual visit it is not required to have an in-person visit of this time. Indicates understanding, virtual visit next week    Written patient dismissal instructions available to Patient/POA       Discharge Instructions       [address]     NOTE: Upon discharge from the [address], you will receive a patient experience survey.  We would be grateful if you would take the time to fill this survey out.     Wound care order history:                 DARIUS's   Right       Left               Date               Vascular studies:   Date               Imaging:   Date             Cultures:   Date               Labs/ HbA1c:   Date               Grafts:  Date               HBO:                Antibiotics:               Earlier Wound care treatments:                Authorizations:                        Consults:   Date                           PCP:      Continuing wound care orders and information:              Residence:               Continue home health care with:               Your wound-care supplies will be provided by: Krystyna Perez provider:              XCKENDUB[facility]P with              Off loading:  Date               QCSEV Meds:              XHJMG cleansing:                           VJ not scrub or use excessive force.                          Wash hands with soap and water before and after dressing changes.                         Prior to applying a clean dressing, cleanse wound with normal saline,                          wound cleanser, or mild soap and water.                           Ask your physician or nurse before getting the wound(s) wet in the shower.              Daily Wound management:                          Keep weight off wounds and reposition every 2 hours.                          Avoid standing for long periods of time.                          Apply wraps/stockings in AM and remove at bedtime.                          Elevate legs to the level of the heart or above for 30 minutes 4-5 times a day and/or when sitting.                                               When taking antibiotics take entire prescription as ordered by MD do not stop taking until medicine is all gone.                                                                 Orders for this week (7/1/20)              Right Lateral foot and Right shin- Wash with mild soap and water. Paint wound with betadine, cover with silver calcium alginate, abd. Protect DTI on medial foot with abd, wrap with conform and secure with tape.  Change Daily.     Follow up with JAYLIN Dubon in 1 week in the wound care center via virtual visit.   Madhu Forget .[phone] for any questions or concerns. Date__________   Time____________  Joaquín Black AGE: 80 y.o. GENDER: female  : 1929 being evaluated by a Virtual Visit (video visit) encounter to address concerns as mentioned above. A caregiver was present when appropriate. Due to this being a TeleHealth encounter (During JYJQQ-40 public health emergency), evaluation of the following organ systems was limited: Vitals/Constitutional/EENT/Resp/CV/GI//MS/Neuro/Skin/Heme-Lymph-Imm. Pursuant to the emergency declaration under the [address] [place] [address], [address] [place] [address] authority and the [employer] Act, this Virtual Visit was conducted with patient's (and/or legal guardian's) consent, to reduce the patient's risk of exposure to COVID-19 and provide necessary medical care. The patient (and/or legal guardian) has also been advised to contact this office for worsening conditions or problems, and seek emergency medical treatment and/or call 911 if deemed necessary. The patients records were reviewed and discussed. Time was given for questions . All questions were answered to the patients satisfaction. A total time of 15 minutes was spent with at least 50% related to face to face counseling and coordination of care. Services were provided through a video synchronous discussion virtually to substitute for in-person clinic visit. Patient and provider were located at their individual homes.     Electronically signed by Charls Gowers, APRN - CNP on 2020 at 12:11 PM

## 2020-07-06 NOTE — TELEPHONE ENCOUNTER
Wound clinic is seeing patient for the right shin and the right foot wounds  Patient is developing new area in the left hip 6 cm x 3 cm. Advised the nurse to contact wound clinic to check on that also.

## 2020-07-09 PROBLEM — L97.509 ISCHEMIC ULCER OF FOOT (HCC): Status: ACTIVE | Noted: 2020-01-01

## 2020-07-09 PROBLEM — R62.7 ADULT FAILURE TO THRIVE: Status: ACTIVE | Noted: 2020-01-01

## 2020-07-09 NOTE — PROGRESS NOTES
Images of the wound(s) is obtained and annotated along with completed description in [address] [place] [address]. [x]   3     Education Points   No Education completed by nursing staff.    []   0   Patient/caregiver is educated on 1-4 topics. Nursing staff identifies learner, confirms understanding of information (verbal, demonstration, written) and documents details. May include Discharge Instructions/AVS, available documents in My Chart, or Web-based learning.  []   1   Patient/caregiver is educated on 5-9 topics. Nursing staff identifies learner, confirms understanding of information (verbal, demonstration, written) and documents details. May include Discharge Instructions/AVS, available documents in My Chart, or Web-based learning. [x]   2   Patient/caregiver is educated on 10 or more topics. Nursing staff identifies learner, confirms understanding of information (verbal, demonstration, written) and documents details. May include Discharge Instructions/AVS, available documents in My Chart, or Web-based learning. []   3     Follow-up Virtual Visit Points   No contact with outside resources made. []   0   Nursing staff contacts 1-2 outside resource. For example, telephone call made to home health, primary care provider, pharmacy, or DME. May include filling out forms and writing letters, arranging transportation, communication with insurance , vendors, etc.  Discharge, instructions and/or After Visit Summary given to patient/caregiver and instructions completed. [x]   1   Nursing staff contacts 3-4 outside resource. For example, telephone calls made to home health, primary care provider, pharmacy, or DME. May include filling out forms and writing letters, arranging transportation, communication with insurance , vendors, etc.  Discharge, instructions and/or After Visit Summary given to patient/caregiver and instructions completed. []   2   Nursing contacts 5 or more outside resource.  For example, telephone calls made to home health, primary care providers, pharmacy, or DME. May include filling out forms and writing letters, arranging transportation, communication with insurance , vendors, etc.  Discharge, instructions and/or After Visit Summary given to patient/caregiver and instructions completed.    []   3     Is this the Patient's First Visit to the [address] [place] [address] Road  No    Is this Patient Established @ [facility]  Yes             Virtual Visit Clinical Level of Care Wound Care      Points  1-3  Level 1 []     Points  4-5  Level 2 []     Points  6-7  Level 3 [x]     Points  8-9  Level 4 []     Points  10-11  Level 5 []       Electronically signed by Yolette Jaeger RN on 7/9/2020 at @NO

## 2020-07-09 NOTE — PROGRESS NOTES
Never Used   Substance Use Topics    Alcohol use: No    Drug use: No       ALLERGIES    Allergies   Allergen Reactions    Neomycin     Pcn [Penicillins]      Fungal infection    Pneumococcal Vaccines      Skin irritation       MEDICATIONS    Current Outpatient Medications on File Prior to Encounter   Medication Sig Dispense Refill    acetaminophen (TYLENOL) 500 MG tablet Take 500 mg by mouth 2 times daily      triamterene-hydrochlorothiazide (MAXZIDE-25) 37.5-25 MG per tablet Take 1 tablet by mouth Indications: Monday, Wednesday, Friday       celecoxib (CELEBREX) 200 MG capsule Take 200 mg by mouth daily.  metoprolol (LOPRESSOR) 50 MG tablet Take 50 mg by mouth daily. Take 1/2 tablet daily      therapeutic multivitamin-minerals (THERAGRAN-M) tablet Take 1 tablet by mouth daily.  Warfarin Sodium (COUMADIN PO) Take by mouth daily Indications: Monday, Wedenesday, Friday 5 mg; alternate days 2.5 mg 5 mg, 4 mg alternant       Calcium Carbonate-Vitamin D (CALCIUM + D PO) Take 600 mg by mouth daily. No current facility-administered medications on file prior to encounter. REVIEW OF SYSTEMS    Pertinent items are noted in HPI.     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 ([facility] [address] [facility]    WD-Deep tissue injury, right medial foot - Primary    PVD (peripheral vascular disease) ([facility] [address]          Performed by: DEBBIE Garcia - CNP    Wound 06/17/20 #1 Right anterior lower leg (Active)   Wound Length (cm) 2 cm 7/9/2020 12:25 PM   Wound Width (cm) 0.3 cm 7/9/2020 12:25 PM   Wound Depth (cm) 0.1 cm 7/9/2020 12:25 PM   Wound Surface Area (cm^2) 0.6 cm^2 7/9/2020 12:25 PM   Change in Wound Size % (l*w) 85 7/9/2020 12:25 PM   Wound Volume (cm^3) 0.06 cm^3 7/9/2020 12:25 PM   Wound Healing % 85 7/9/2020 12:25 PM   Distance Tunneling (cm) 0 cm 6/17/2020 11:06 AM   Tunneling Position ___ O'Clock 0 6/17/2020 11:06 AM   Undermining Starts ___ O'Clock 0 6/17/2020 11:06 AM   Undermining Ends___ O'Clock 0 6/17/2020 11:06 AM   Undermining Maxium Distance (cm) 0 6/17/2020 11:06 AM   Wound Assessment Black; Yellow 6/17/2020 11:06 AM   Drainage Amount Moderate 6/17/2020 11:06 AM   Drainage Description Serosanguinous 6/17/2020 11:06 AM   Odor None 6/17/2020 11:06 AM   Margins Defined edges 6/17/2020 11:06 AM   Jaja-wound Assessment Pink 6/17/2020 11:06 AM   Non-staged Wound Description Full thickness 6/17/2020 11:06 AM   [place]%Wound Bed 0 6/17/2020 11:06 AM   Red%Wound Bed 0 6/17/2020 11:06 AM   Yellow%Wound Bed 50 6/17/2020 11:06 AM   Black%Wound Bed 50 6/17/2020 11:06 AM   Purple%Wound Bed 0 6/17/2020 11:06 AM   Number of days: 22       Wound 06/17/20 #2 Right medial foot (Active) Proximal   Wound Length (cm) 1.2 cm 7/9/2020 12:25 PM   Wound Width (cm) 1.2 cm 7/9/2020 12:25 PM   Wound Depth (cm) 0.1 cm 7/9/2020 12:25 PM   Wound Surface Area (cm^2) 1.44 cm^2 7/9/2020 12:25 PM   Change in Wound Size % (l*w) 4 7/9/2020 12:25 PM   Wound Volume (cm^3) 0.14 cm^3 7/9/2020 12:25 PM   Wound Healing % 7 7/9/2020 12:25 PM   Distance Tunneling (cm) 0 cm 6/17/2020 11:06 AM   Tunneling Position ___ O'Clock 0 6/17/2020 11:06 AM   Undermining Starts ___ O'Clock 0 6/17/2020 11:06 AM   Undermining Ends___ O'Clock 0 6/17/2020 11:06 AM   Undermining Maxium Distance (cm) 0 6/17/2020 11:06 AM   Wound Assessment Purple 6/17/2020 11:06 AM   Drainage Amount Moderate 6/17/2020 11:06 AM   Drainage Description Serosanguinous 6/17/2020 11:06 AM   Odor None 6/17/2020 11:06 AM   Margins Defined edges 6/17/2020 11:06 AM   Jaja-wound Assessment Red 6/17/2020 11:06 AM   [place]%Wound Bed 0 6/17/2020 11:06 AM   Red%Wound Bed 0 6/17/2020 11:06 AM   Yellow%Wound Bed 0 6/17/2020 11:06 AM   Black%Wound Bed 0 6/17/2020 11:06 AM   Purple%Wound Bed 100 6/17/2020 11:06 AM   Other%Wound Bed 0 6/17/2020 11:06 AM   Number of days: 22       Wound 06/17/20 # 3 Right lateral foot (Active)   Wound Length (cm) 3.5 cm 7/9/2020 12:25 PM   Wound Width (cm) 5.2 cm 7/9/2020 12:25 PM   Wound Depth (cm) 0.1 cm 7/9/2020 12:25 PM   Wound Surface Area (cm^2) 18.2 cm^2 7/9/2020 12:25 PM   Change in Wound Size % (l*w) -169.63 7/9/2020 12:25 PM   Wound Volume (cm^3) 1.82 cm^3 7/9/2020 12:25 PM   Wound Healing % -168 7/9/2020 12:25 PM   Distance Tunneling (cm) 0 cm 6/17/2020 11:06 AM   Tunneling Position ___ O'Clock 0 6/17/2020 11:06 AM   Undermining Starts ___ O'Clock 0 6/17/2020 11:06 AM   Undermining Ends___ O'Clock 0 6/17/2020 11:06 AM   Undermining Maxium Distance (cm) 0 6/17/2020 11:06 AM   Wound Assessment Black 6/17/2020 11:06 AM   Drainage Amount Moderate 6/17/2020 11:06 AM   Drainage Description Serosanguinous 6/17/2020 11:06 AM   Odor None 6/17/2020 11:06 AM   Margins Defined edges 6/17/2020 11:06 AM   Jaja-wound Assessment Pink 6/17/2020 11:06 AM   Non-staged Wound Description Full thickness 6/17/2020 11:06 AM   [place]%Wound Bed 0 6/17/2020 11:06 AM   Red%Wound Bed 0 6/17/2020 11:06 AM   Yellow%Wound Bed 0 6/17/2020 11:06 AM   Black%Wound Bed 100 6/17/2020 11:06 AM   Purple%Wound Bed 0 6/17/2020 11:06 AM   Other%Wound Bed 0 6/17/2020 11:06 AM   Number of days: 22       Wound 07/09/20 Foot Right;Medial;Distal # 4 (Active)   Wound Length (cm) 1 cm 7/9/2020 12:29 PM   Wound Width (cm) 0.8 cm 7/9/2020 12:29 PM   Wound Depth (cm) 0.1 cm 7/9/2020 12:29 PM   Wound Surface Area (cm^2) 0.8 cm^2 7/9/2020 12:29 PM   Wound Volume (cm^3) 0.08 cm^3 7/9/2020 12:29 PM   Number of days: 0       Wound 07/09/20 Coccyx # 5 (Active)   Wound Length (cm) 0.8 cm 7/9/2020 12:29 PM   Wound Width (cm) 1.2 cm 7/9/2020 12:29 PM   Wound Depth (cm) 0.1 cm 7/9/2020 12:29 PM   Wound Surface Area (cm^2) 0.96 cm^2 7/9/2020 12:29 PM   Wound Volume (cm^3) 0.1 cm^3 7/9/2020 12:29 PM   Number of days: 0       Wound 07/09/20 Hip Left # 6 (Active)   Wound Length (cm) 2 cm 7/9/2020 12:29 PM   Wound Width (cm) 3.8 cm 7/9/2020 12:29 PM   Wound Depth (cm) 0.1 cm 7/9/2020 12:29 PM   Wound Surface Area (cm^2) 7.6 cm^2 7/9/2020 12:29 PM   Wound Volume (cm^3) 0.76 cm^3 7/9/2020 12:29 PM   Number of days: 0          Plan:     Please see attached Discharge Instructions    Based upon this virtual visit it is not required to have an in-person visit of this time. SNF Indicates understanding, virtual visit next week    The patient has new wounds this week, SNF reports the patients condition is declining and the family has decided on comfort care, Dr. Gloria Best is coming to evaluate the patient today. The cardiovascular consult will be cancelled. Multiple measures are in place to minimize risk of developing further wounds and worsening of current wound. These measures include off loading, speciality mattress, Moon boots, abductor brace and comfort measures. Written patient dismissal instructions available to Patient/POA       Discharge Instructions       PHYSICIAN ORDERS AND DISCHARGE INSTRUCTIONS     NOTE: Upon discharge from the [address], you will receive a patient experience survey.  We would be grateful if you would take the time to fill this survey out.     Wound care order history:                 DARIUS's   Right       Left               Date               Vascular studies:   Date               Imaging:   Date               Cultures:   Date               Labs/ HbA1c:   Date               Grafts:  Date               HBO:                Antibiotics:               Earlier Wound care treatments:                Authorizations:                        Consults:   Date                           PCP:      Continuing wound care orders and information:              Residence:               Continue home health care with:               Your wound-care supplies will be provided by: Herve Castillo provider:              [email or web address] with              Off loading:  Date               PBOUS Meds:              NJFNP cleansing:                           VX not scrub or use excessive force.                          Wash hands with soap and water before and after dressing changes.                         Prior to applying a clean dressing, cleanse wound with normal saline,                          wound cleanser, or mild soap and water.                           Ask your physician or nurse before getting the wound(s) wet in the shower.              Daily Wound management:                          Keep weight off wounds and reposition every 2 hours.                          Avoid standing for long periods of time.                          Apply wraps/stockings in AM and remove at bedtime.                          Elevate legs to the level of the heart or above for 30 minutes 4-5 times a day and/or when sitting.                                               When taking antibiotics take entire prescription as ordered by MD do not stop taking until medicine is all gone.                                                                 Orders for this week (20)              Right Foot, Right Leg Wash with mild soap and water. Paint wound with betadine, cover with silver calcium alginate, abd. Protect DTI on medial foot with abd, wrap with conform and secure with tape. Change Daily.     Left Hip-- Clean with soap and water, pat dry. Betadine to eschar area, may pad and protect with ABD if needed. Change Daily    Coccyx-- Clean with soap and water, pat dry, barrier cream of choice. May pad and protect with mepilex border if needed. Change daily    Follow up with Sal Perez CNP in 1 week in the wound care center via virtual visit.   Robinson Grubbs [phone] for any questions or concerns. Date__________   Time____________        Samra Burnett AGE: 80 y.o.  GENDER: female  : 1929 being evaluated by

## 2020-07-10 NOTE — PROGRESS NOTES
Progress note  ___________________________    Therjose Single  Pateint's  is 1929  SEEN ON 2020 at [facility]  ___________________________    S:  Patient has chronic atrial fibrillation, hypertension, hyperlipidemia. Patient condition has deteriorated in the last few weeks  She has developed ischemic ulcer and wound on the right foot and recently ulcer in the left hip. Patient is CONFUSED and  Disoriented at times. Her appetite is poor and she is not eating. She is refusing the medications and food. Patient is seen by wound clinic and they are managing the wound. Patient had arterial Doppler studies done which showed patient has peripheral vascular disease and the wound clinic for the patient is vascular intervention for the ulcers to heal.  Nurses discussed with the family and patient's daughters about her condition. I also spoke to patient's daughter. Patient history is not reliable. Patient is lying in bed. ALLERGIES:  Allergies   Allergen Reactions    Neomycin     Pcn [Penicillins]      Fungal infection    Pneumococcal Vaccines      Skin irritation        PAST MEDICAL HISTORY:    has a past medical history of Arrhythmia, Arthritis, H/O blood clots,HTN (hypertension), and Hyperlipidemia. History of atrial fibrillation. Peripheral vascular disease. Ischemic ulcers       PAST SURGICAL HISTORY:   has a past surgical history that includes Total knee arthroplasty (); knee surgery (); Hysterectomy; and Hip Arthroplasty (Left, 2013). SOCIAL HISTORY:   reports that she has never smoked. She has never used smokeless tobacco. She reports that she does not drink alcohol or use drugs. Vital signs: As in [facility]  GENERAL: - Patient is awake but confused  HEENT: - Conjunctiva pink, no scleral icterus. ENT clear. NECK: - Supple. No jugular venous distention noted.  No masses felt,  CARDIOVASCULAR: - Normal S1 and S2    PULMONARY: - No respiratory distress. No wheezes or rales. ABDOMEN: - Soft and non-tender,no masses  or organomegaly. EXTREMITIES: - No cyanosis, clubbing, or significant edema. Patient has wound on the right foot laterally and also medially. She has developed a wound on the left hip laterally about 4 cm x 3 cm. SKIN: Skin is warm and dry. NEUROLOGICAL: - Patient is awake and able to move all the extremities. Assessment:  . Adult failure to thrive  . Ischemic ulcer/wound right foot  . Left hip decubitus ulcer. .  Peripheral vascular disease  . Chronic atrial fibrillation   . Degenerative spine. .  Chronic anticoagulation with Coumadin   . Gait disturbance   . Hypertension   . Hyperlipidemia        Plan:  Patient condition has deteriorated in the last few weeks. Her appetite is poor she is not eating. She refusing medications also. She has ischemic ulcers and wounds. Discussed with patient's daughters about her declining physical condition. The nurses spoke to 1  Daughter and I spoke together from daughter Mehdi Wagoner. They do not want any aggressive measures. No intervention for the peripheral vascular disease and wants comfort measures implemented. They opted for palliative care. They want patient to be kept comfortable  Will start patient on Roxanol and Ativan as needed  We will continue with wound nurse suggestion for the wound care without any intervention for peripheral vascular disease. INR is 2.36.   Continue same dose of warfarin  Medication reviewed  Continue current medications  Patient is a DNR CC.

## 2020-07-17 NOTE — PROGRESS NOTES
Virtual Visit Wound Care  Clinic Level of Care   NAME:  Rosalio Mcnamara  YOB: 1929 GENDER: female  MEDICAL RECORD NUMBER:  [id number]   DATE:  7/17/2020     Patient Type Points   No documentation completed by nursing staff. []   0   Nursing staff documented in the navigator for an ESTABLISHED patient including Episode, Patient ID, Chief Complaint, Travel Screen, Allergies, Latex Allergy, Home Medication, History, Psychosocial Screen, C-SSRS Screen, Fall Risk, Nutritional Screen, Advanced Directive, Education and Plan of Care, and Discharge Instructions. The Functional Screening tab is only required if the patient's status changes. [x]   1   Nursing staff documented in the navigator for a NEW patient including Patient ID, Chief Complaint, Travel Screen, Allergies, Latex Allergy, Home Medication, History, Psychosocial Screen, C-SSRS Screen, Fall Risk, Nutritional Screen, Advanced Directive, Functional Screen, Education and Plan of Care, and Discharge Instructions. []   2   Nursing staff documented in the navigator for a CONSULT patient including Episode, Patient ID, Chief Complaint, Travel Screen, Allergies, Latex Allergy, Home Medication, History, Psychosocial Screen, C-SSRS Screen, Fall Risk, Nutritional Screen, Advanced Directive, Functional Screen, Education and Plan of Care, and Discharge Instructions. []   2     Wound Description Points   Unable to obtain image of Wound. For example, patient/caregiver is instructed not to remove dressing, is unable to correctly position smart phone, no smart phone is available, patient is unable to maintain connectivity or the patient's wound is healed. [x]   0   1-3 wound images annotated. Images of the wound(s) is obtained and annotated along with completed description in [address] [place] [address]. [x]   1   4-5 wound images annotated. Images of the wound(s) is obtained and annotated along with completed description in [address] [place] [address]. []   2   Greater than 6 wound images annotated. Images of the wound(s) is obtained and annotated along with completed description in [address] [place] [address]. []   3     Education Points   No Education completed by nursing staff.    []   0   Patient/caregiver is educated on 1-4 topics. Nursing staff identifies learner, confirms understanding of information (verbal, demonstration, written) and documents details. May include Discharge Instructions/AVS, available documents in My Chart, or Web-based learning. [x]   1   Patient/caregiver is educated on 5-9 topics. Nursing staff identifies learner, confirms understanding of information (verbal, demonstration, written) and documents details. May include Discharge Instructions/AVS, available documents in My Chart, or Web-based learning. []   2   Patient/caregiver is educated on 10 or more topics. Nursing staff identifies learner, confirms understanding of information (verbal, demonstration, written) and documents details. May include Discharge Instructions/AVS, available documents in My Chart, or Web-based learning. []   3     Follow-up Virtual Visit Points   No contact with outside resources made. []   0   Nursing staff contacts 1-2 outside resource. For example, telephone call made to home health, primary care provider, pharmacy, or DME. May include filling out forms and writing letters, arranging transportation, communication with insurance , vendors, etc.  Discharge, instructions and/or After Visit Summary given to patient/caregiver and instructions completed. [x]   1   Nursing staff contacts 3-4 outside resource. For example, telephone calls made to home health, primary care provider, pharmacy, or DME. May include filling out forms and writing letters, arranging transportation, communication with insurance , vendors, etc.  Discharge, instructions and/or After Visit Summary given to patient/caregiver and instructions completed. []   2   Nursing contacts 5 or more outside resource.  For example,

## 2020-07-17 NOTE — PROGRESS NOTES
 Smokeless tobacco: Never Used   Substance Use Topics    Alcohol use: No    Drug use: No       ALLERGIES    Allergies   Allergen Reactions    Neomycin     Pcn [Penicillins]      Fungal infection    Pneumococcal Vaccines      Skin irritation       MEDICATIONS    Current Outpatient Medications on File Prior to Encounter   Medication Sig Dispense Refill    acetaminophen (TYLENOL) 500 MG tablet Take 500 mg by mouth 2 times daily      triamterene-hydrochlorothiazide (MAXZIDE-25) 37.5-25 MG per tablet Take 1 tablet by mouth Indications: Monday, Wednesday, Friday       celecoxib (CELEBREX) 200 MG capsule Take 200 mg by mouth daily.  metoprolol (LOPRESSOR) 50 MG tablet Take 50 mg by mouth daily. Take 1/2 tablet daily      therapeutic multivitamin-minerals (THERAGRAN-M) tablet Take 1 tablet by mouth daily.  Warfarin Sodium (COUMADIN PO) Take by mouth daily Indications: Monday, Wedenesday, Friday 5 mg; alternate days 2.5 mg 5 mg, 4 mg alternant       Calcium Carbonate-Vitamin D (CALCIUM + D PO) Take 600 mg by mouth daily. No current facility-administered medications on file prior to encounter. REVIEW OF SYSTEMS    Pertinent items are noted in HPI.     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 ([address]    WD-Deep tissue injury, right medial foot    PVD (peripheral vascular disease) ([address]    Adult failure to thrive    Ischemic ulcer of foot ([address]          Performed by: DEBBIE Ramírez - CNP      Wound 06/17/20 #1 Right anterior lower leg (Active)   Wound Length (cm) 2 cm 07/09/20 1225   Wound Width (cm) 0.3 cm 07/09/20 1225   Wound Depth (cm) 0.1 cm 07/09/20 1225   Wound Surface Area (cm^2) 0.6 cm^2 07/09/20 1225   Change in Wound Size % (l*w) 85 07/09/20 1225   Wound Volume (cm^3) 0.06 cm^3 07/09/20 1225   Wound Healing % 85 07/09/20 1225   Distance Tunneling (cm) 0 cm 06/17/20 1106   Tunneling Position ___ O'Clock 0 06/17/20 1106   Undermining Starts ___ O'Clock 0 06/17/20 1106   Undermining Ends___ O'Clock 0 06/17/20 1106   Undermining Maxium Distance (cm) 0 06/17/20 1106   Wound Assessment Black; Yellow 06/17/20 1106   Drainage Amount Moderate 06/17/20 1106   Drainage Description Serosanguinous 06/17/20 1106   Odor None 06/17/20 1106   Margins Defined edges 06/17/20 1106   Jaja-wound Assessment Pink 06/17/20 1106   Non-staged Wound Description Full thickness 06/17/20 1106   [place]%Wound Bed 0 06/17/20 1106   Red%Wound Bed 0 06/17/20 1106   Yellow%Wound Bed 50 06/17/20 1106   Black%Wound Bed 50 06/17/20 1106   Purple%Wound Bed 0 06/17/20 1106   Number of days: 30       Wound 06/17/20 #2 Right medial foot (Active)   Wound Length (cm) 1 cm 07/17/20 1042   Wound Width (cm) 1 cm 07/17/20 1042   Wound Depth (cm) 0.1 cm 07/17/20 1042   Wound Surface Area (cm^2) 1 cm^2 07/17/20 1042   Change in Wound Size % (l*w) 33.33 07/17/20 1042   Wound Volume (cm^3) 0.1 cm^3 07/17/20 1042   Wound Healing % 33 07/17/20 1042   Distance Tunneling (cm) 0 cm 06/17/20 1106   Tunneling Position ___ O'Clock 0 06/17/20 1106   Undermining Starts ___ O'Clock 0 06/17/20 1106   Undermining Ends___ O'Clock 0 06/17/20 1106   Undermining Maxium Distance (cm) 0 06/17/20 1106   Wound Assessment Purple 06/17/20 1106   Drainage Amount Moderate 06/17/20 1106   Drainage Description Serosanguinous 06/17/20 1106   Odor None 06/17/20 1106   Margins Defined edges 06/17/20 1106   Jaja-wound Assessment Red 06/17/20 1106   [place]%Wound Bed 0 06/17/20 1106   Red%Wound Bed 0 06/17/20 1106   Yellow%Wound Bed 0 06/17/20 1106   Black%Wound Bed 0 06/17/20 1106   Purple%Wound Bed 100 06/17/20 1106   Other%Wound Bed 0 06/17/20 1106   Number of days: 30       Wound 06/17/20 # 3 Right lateral foot (Active)   Wound Length (cm) 6.5 cm 07/17/20 1042   Wound Width (cm) 4.5 cm 07/17/20 1042   Wound Depth (cm) 0.1 cm 07/17/20 1042   Wound Surface Area (cm^2) 29.25 cm^2 07/17/20 1042   Change in Wound Size % (l*w) -333.33 07/17/20 1042   Wound Volume (cm^3) 2.92 cm^3 07/17/20 1042   Wound Healing % -329 07/17/20 1042   Distance Tunneling (cm) 0 cm 06/17/20 1106   Tunneling Position ___ O'Clock 0 06/17/20 1106   Undermining Starts ___ O'Clock 0 06/17/20 1106   Undermining Ends___ O'Clock 0 06/17/20 1106   Undermining Maxium Distance (cm) 0 06/17/20 1106   Wound Assessment Black 06/17/20 1106   Drainage Amount Moderate 06/17/20 1106   Drainage Description Serosanguinous 06/17/20 1106   Odor None 06/17/20 1106   Margins Defined edges 06/17/20 1106   Jaja-wound Assessment Pink 06/17/20 1106   Non-staged Wound Description Full thickness 06/17/20 1106   [place]%Wound Bed 0 06/17/20 1106   Red%Wound Bed 0 06/17/20 1106   Yellow%Wound Bed 0 06/17/20 1106   Black%Wound Bed 100 06/17/20 1106   Purple%Wound Bed 0 06/17/20 1106   Other%Wound Bed 0 06/17/20 1106   Number of days: 30       Wound 07/09/20 Foot Right #4 Right Medial Distal foot (Active)   Wound Length (cm) 2.5 cm 07/17/20 1042   Wound Width (cm) 2 cm 07/17/20 1042   Wound Depth (cm) 0.1 cm 07/17/20 1042   Wound Surface Area (cm^2) 5 cm^2 07/17/20 1042   Change in Wound Size % (l*w) -525 07/17/20 1042   Wound Volume (cm^3) 0.5 cm^3 07/17/20 1042   Wound Healing % -525 07/17/20 1042   Number of days: 7       Wound 07/09/20 Coccyx # 5 (Active)   Wound Length (cm) 2.5 cm 07/17/20 1042   Wound Width (cm) 3 cm 07/17/20 1042   Wound Depth (cm) 0.2 cm 07/17/20 1042   Wound Surface Area (cm^2) 7.5 cm^2 07/17/20 1042   Change in Wound Size % (l*w) -681.25 07/17/20 1042   Wound Volume (cm^3) 1.5 cm^3 07/17/20 1042   Wound Healing % -1400 07/17/20 1042   Number of days: 7       Wound 07/09/20 Hip Left # 6 (Active)   Wound Length (cm) 3.5 cm 20 1042   Wound Width (cm) 2 cm 20 1042   Wound Depth (cm) 0.1 cm 20 1042   Wound Surface Area (cm^2) 7 cm^2 20 1042   Change in Wound Size % (l*w) 7.89 20 1042   Wound Volume (cm^3) 0.7 cm^3 20 1042   Wound Healing % 8 20 1042   Number of days: 7       Wound 20 Foot Right #8 Right 4th and 5th toe space (Active)   Wound Length (cm) 1.4 cm 20 1042   Wound Width (cm) 1 cm 20 1042   Wound Depth (cm) 0.5 cm 20 1042   Wound Surface Area (cm^2) 1.4 cm^2 20 1042   Wound Volume (cm^3) 0.7 cm^3 20 1042   Number of days: 0       Wound 20 Ankle Right #7 Right Medial Ankle (Active)   Wound Length (cm) 0.5 cm 20 1042   Wound Width (cm) 1 cm 20 1042   Wound Depth (cm) 0.1 cm 20 1042   Wound Surface Area (cm^2) 0.5 cm^2 20 1042   Wound Volume (cm^3) 0.05 cm^3 20 1042   Number of days: 0          Plan:     Please see attached Discharge Instructions    Based upon this virtual visit it is not required to have an in-person visit of this time. Patients condition continues to decline, the patient was placed on comfort care last week. Discussed hospice with SNF and family, family would like to proceed. Multiple measures are in place to minimize risk of developing further wounds and worsening of current wound. These measures include off loading, speciality mattress, Moon boots, abductor brace and comfort measures. Indicates understanding    Written patient dismissal instructions available to Patient/HOME Baez AGE: 80 y.o. GENDER: female  : 1929 being evaluated by a Virtual Visit (video visit) encounter to address concerns as mentioned above. A caregiver was present when appropriate. Due to this being a TeleHealth encounter (During UJTBF-33 public health emergency), evaluation of the following organ systems was limited: Vitals/Constitutional/EENT/Resp/CV/GI//MS/Neuro/Skin/Heme-Lymph-Imm. Pursuant to the emergency declaration under the [facility], [address] [place] [address] authority and the [employer] Act, this Virtual Visit was conducted with patient's (and/or legal guardian's) consent, to reduce the patient's risk of exposure to COVID-19 and provide necessary medical care. The patient (and/or legal guardian) has also been advised to contact this office for worsening conditions or problems, and seek emergency medical treatment and/or call 911 if deemed necessary. The patients records were reviewed and discussed. Time was given for questions . All questions were answered to the patients satisfaction. A total time of 15 minutes was spent with at least 50% related to face to face counseling and coordination of care. Services were provided through a video synchronous discussion virtually to substitute for in-person clinic visit. Patient and provider were located at their individual homes.     Electronically signed by DEBBIE Pichardo CNP on 7/17/2020 at 12:05 PM

## 2020-07-23 ENCOUNTER — CARE COORDINATION (OUTPATIENT)
Dept: CASE MANAGEMENT | Age: 85
End: 2020-07-23

## 2020-07-23 NOTE — CARE COORDINATION
Care transisiton Note    [facility] and was told pt  on 20        Warden Gupta -[phone]